# Patient Record
Sex: MALE | Race: WHITE | NOT HISPANIC OR LATINO | Employment: OTHER | URBAN - METROPOLITAN AREA
[De-identification: names, ages, dates, MRNs, and addresses within clinical notes are randomized per-mention and may not be internally consistent; named-entity substitution may affect disease eponyms.]

---

## 2019-07-20 ENCOUNTER — HOSPITAL ENCOUNTER (EMERGENCY)
Facility: HOSPITAL | Age: 59
Discharge: HOME | End: 2019-07-21
Attending: EMERGENCY MEDICINE | Admitting: EMERGENCY MEDICINE
Payer: COMMERCIAL

## 2019-07-20 VITALS
TEMPERATURE: 98.6 F | BODY MASS INDEX: 27.71 KG/M2 | OXYGEN SATURATION: 96 % | SYSTOLIC BLOOD PRESSURE: 173 MMHG | RESPIRATION RATE: 16 BRPM | HEIGHT: 70 IN | HEART RATE: 77 BPM | DIASTOLIC BLOOD PRESSURE: 84 MMHG | WEIGHT: 193.56 LBS
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
## 2019-07-20 PROCEDURE — 99281 EMR DPT VST MAYX REQ PHY/QHP: CPT

## 2019-07-20 RX ORDER — DIPHENHYDRAMINE HCL 25 MG
25 TABLET ORAL ONCE
Status: COMPLETED | OUTPATIENT
Start: 2019-07-21 | End: 2019-07-20

## 2019-07-20 RX ORDER — CEPHALEXIN 500 MG/1
500 CAPSULE ORAL EVERY 12 HOURS SCHEDULED
Qty: 14 CAPSULE | Refills: 0 | Status: SHIPPED | OUTPATIENT
Start: 2019-07-20 | End: 2019-07-27

## 2019-07-20 RX ORDER — CEPHALEXIN 250 MG/1
500 CAPSULE ORAL ONCE
Status: COMPLETED | OUTPATIENT
Start: 2019-07-20 | End: 2019-07-20

## 2019-07-20 RX ADMIN — CEPHALEXIN 500 MG: 250 CAPSULE ORAL at 23:50

## 2019-07-20 RX ADMIN — DIPHENHYDRAMINE HCL 25 MG: 25 TABLET ORAL at 23:50

## 2019-07-21 PROCEDURE — 99283 EMERGENCY DEPT VISIT LOW MDM: CPT | Performed by: PHYSICIAN ASSISTANT

## 2019-07-25 NOTE — ED PROVIDER NOTES
Pt Name: Lolly [place]  MRN: [id number]  YOB: 1960  Age/Sex: 63 y o  male  Date of evaluation: 7/20/2019  PCP: Malcolm London MD    [address] [place] [address]    Chief Complaint   Patient presents with    Insect Bite     Pt presents to ED from home w/ two bug bites noticed yesterday  Area itching, pt stated swelling, redness noted  Pt has hx of bug bites turning celluitis  Pt (+) hx stroke  HPI    Annamarie Bravo presents to the Emergency Department complaining of Insect bite, Rash  Lolly [place] is a 63 y o  male who presents due to Insect bite  Pt presents to ED from home w/ two bug bites noticed yesterday, sts that he was mowing the law, doing work outside and suspects it is from this time period  Pt notes area on L calf especially with itching, swelling, redness, warmth noted, notes he has hx bug bites turning celluitis and presents to ED for abx  Denies f/c/s, lightheadedness, dizziness, n/v, and no other complaints at this time  History provided by:  Patient   used: No    Insect Bite   Contact animal:  Insect  Location:  Leg  Leg injury location:  L lower leg and R lower leg  Pain details:     Quality:  Itching and localized    Severity:  Mild    Timing:  Constant    Progression:  Worsening  Incident location:  Outside and home  Tetanus status:  Up to date  Relieved by:  None tried  Worsened by:  Nothing  Ineffective treatments:  None tried  Associated symptoms: rash and swelling    Associated symptoms: no fever and no numbness          Past Medical and Surgical History    Past Medical History:   Diagnosis Date    Stroke [facility])        Past Surgical History:   Procedure Laterality Date    HERNIA REPAIR         History reviewed  No pertinent family history  Social History     Tobacco Use    Smoking status: Current Every Day Smoker     Packs/day: 1 00     Types: Cigarettes    Smokeless tobacco: Never Used   Substance Use Topics    Alcohol use:  Yes     Alcohol/week: 14 0 standard drinks     Types: 14 Glasses of wine per week    Drug use: Yes     Frequency: 1 0 times per week     Types: Marijuana           Allergies    Allergies   Allergen Reactions    Penicillins Rash       Home Medications:    Prior to Admission medications    Medication Sig Start Date End Date Taking? Authorizing Provider   cephalexin (KEFLEX) 500 mg capsule Take 1 capsule (500 mg total) by mouth every 12 (twelve) hours for 7 days 7/20/19 7/27/19  Sarah Wang PA-C           Review of Systems    Review of Systems   Constitutional: Negative for fever  Skin: Positive for rash  Neurological: Negative for numbness  All other systems reviewed and negative  Physical Exam      ED Triage Vitals [07/20/19 2255]   Temperature Pulse Respirations Blood Pressure SpO2   98 6 °F (37 °C) 77 16 (!) 173/84 96 %      Temp Source Heart Rate Source Patient Position - Orthostatic VS BP Location FiO2 (%)   Oral Monitor Sitting Left arm --      Pain Score       2               Physical Exam   Constitutional: He is oriented to person, place, and time  He appears well-developed and well-nourished  No distress  HENT:   Head: Normocephalic and atraumatic  Right Ear: External ear normal    Left Ear: External ear normal    Nose: Nose normal    Mouth/Throat: Oropharynx is clear and moist  No oropharyngeal exudate  Eyes: Pupils are equal, round, and reactive to light  Conjunctivae and EOM are normal    Neck: Normal range of motion  Neck supple  Cardiovascular: Normal rate, regular rhythm, normal heart sounds and intact distal pulses  Exam reveals no gallop and no friction rub  No murmur heard  Pulmonary/Chest: Effort normal and breath sounds normal  No respiratory distress  He has no wheezes  He has no rales  He exhibits no tenderness  Musculoskeletal: Normal range of motion  Neurological: He is alert and oriented to person, place, and time  Skin: Skin is warm   Capillary refill takes less than 2 seconds  Rash noted  He is not diaphoretic  There is erythema  No regional LAD           Diagnostic Results    ECG      Labs:    No results found for this or any previous visit  All labs reviewed and utilized in the medical decision making process    Radiology:    No orders to display       All radiology studies independently viewed by me and interpreted by the radiologist     Procedure    Procedures      Assessment and Plan    [facility]    Initial ED assessment:  Helio Villegas is a 63 y o  male with significant PMH for Cellulitis s/p insect bite who presents with Insect bite, Rash  Vitals signs reviewed   Physical examination remarkable for approximately 3 5 cm area of erythema, warmth, swelling on left medial calf with warmth, skin is blanchable, no noted drainage, US performed bedside with no visible collection  Initial Ddx  includes but is not limited to:   insect bite, spider bite, cellulitis, folliculitis, allergic reaction; doubt necrotizing fasciitis or anaphylaxis or abscess  Initial ED plan:   Plan will be to treat symptomatically with abx, cold pack  Final ED summary/disposition: Circled area of erythema with skin pen, first dose Abx given in ED  Home care recommendations given with discharge paperwork  Return to ED instructions given if new/worsening sxs  Coding    ED Course of Care and Re-Assessments                            Medications   cephalexin (KEFLEX) capsule 500 mg (500 mg Oral Given 7/20/19 2350)   diphenhydrAMINE (BENADRYL) tablet 25 mg (25 mg Oral Given 7/20/19 2350)         FINAL IMPRESSION    Final diagnoses:   Insect bite - bilateral legs without evidence of cellulitis         DISPOSITION/PLAN  Time reflects when diagnosis was documented in both MDM as applicable and the Disposition within this note     Time User Action Codes Description Comment    7/20/2019 11:40 PM Phil Bumpers  XXXA] Insect bite     7/20/2019 11:40 PM Liliana Grade Modify [K54  Lo Maldonado Insect bite bilateral legs without evidence of cellulitis      ED Disposition     ED Disposition Condition Date/Time Comment    Discharge Stable Sat Jul 20, 2019 11:40 PM Esther Sunshine discharge to home/self care  Follow-up Information     Follow up With Specialties Details Why Contact Info Additional [address] Emergency Department Emergency Medicine Go to  For follow up [address] [facility]  AN ED,  [address], [place], [place], [facility] Call  For follow up [address] [address]  [address] [facility] Internal Medicine [place], [address] [place] [address], [id number]              PATIENT REFERRED TO:    Dipesh Tolliver Emergency Department  [address] [address] [phone]  Go to   For follow up    [facility] [place]  [address] [id number] [phone]  Call   For follow up      DISCHARGE MEDICATIONS:    Discharge Medication List as of 7/20/2019 11:42 PM      START taking these medications    Details   cephalexin (KEFLEX) 500 mg capsule Take 1 capsule (500 mg total) by mouth every 12 (twelve) hours for 7 days, Starting Sat 7/20/2019, Until Sat 7/27/2019, Print             No discharge procedures on file           LYNSEY Feldman PA-C  07/25/19 9620

## 2019-09-13 ENCOUNTER — TELEPHONE (OUTPATIENT)
Dept: FAMILY MEDICINE CLINIC | Facility: CLINIC | Age: 59
End: 2019-09-13

## 2020-04-10 LAB — HBA1C MFR BLD HPLC: 5.7 %

## 2020-10-29 ENCOUNTER — TELEPHONE (OUTPATIENT)
Dept: FAMILY MEDICINE CLINIC | Facility: CLINIC | Age: 60
End: 2020-10-29

## 2020-10-29 ENCOUNTER — OFFICE VISIT (OUTPATIENT)
Dept: FAMILY MEDICINE CLINIC | Facility: CLINIC | Age: 60
End: 2020-10-29
Payer: COMMERCIAL

## 2020-10-29 VITALS
HEART RATE: 88 BPM | TEMPERATURE: 98 F | WEIGHT: 189.6 LBS | BODY MASS INDEX: 28.08 KG/M2 | OXYGEN SATURATION: 97 % | HEIGHT: 69 IN | RESPIRATION RATE: 20 BRPM | DIASTOLIC BLOOD PRESSURE: 100 MMHG | SYSTOLIC BLOOD PRESSURE: 144 MMHG

## 2020-10-29 DIAGNOSIS — Z86.73 HISTORY OF CVA (CEREBROVASCULAR ACCIDENT): ICD-10-CM

## 2020-10-29 DIAGNOSIS — E78.2 MIXED HYPERLIPIDEMIA: ICD-10-CM

## 2020-10-29 DIAGNOSIS — I10 ELEVATED SYSTOLIC BLOOD PRESSURE READING WITH DIAGNOSIS OF HYPERTENSION: ICD-10-CM

## 2020-10-29 DIAGNOSIS — Z12.5 ENCOUNTER FOR PROSTATE CANCER SCREENING: ICD-10-CM

## 2020-10-29 DIAGNOSIS — F32.1 CURRENT MODERATE EPISODE OF MAJOR DEPRESSIVE DISORDER WITHOUT PRIOR EPISODE (HCC): ICD-10-CM

## 2020-10-29 DIAGNOSIS — M79.672 LEFT FOOT PAIN: Primary | ICD-10-CM

## 2020-10-29 PROBLEM — F32.9 CURRENT EPISODE OF MAJOR DEPRESSIVE DISORDER WITHOUT PRIOR EPISODE: Status: ACTIVE | Noted: 2020-10-29

## 2020-10-29 PROCEDURE — 3077F SYST BP >= 140 MM HG: CPT | Performed by: FAMILY MEDICINE

## 2020-10-29 PROCEDURE — 3080F DIAST BP >= 90 MM HG: CPT | Performed by: FAMILY MEDICINE

## 2020-10-29 PROCEDURE — 99214 OFFICE O/P EST MOD 30 MIN: CPT | Performed by: FAMILY MEDICINE

## 2020-10-29 PROCEDURE — 3725F SCREEN DEPRESSION PERFORMED: CPT | Performed by: FAMILY MEDICINE

## 2020-10-29 RX ORDER — ARMODAFINIL 250 MG/1
250 TABLET ORAL AS NEEDED
COMMUNITY

## 2020-10-29 RX ORDER — ATORVASTATIN CALCIUM 20 MG/1
20 TABLET, FILM COATED ORAL DAILY
COMMUNITY
Start: 2020-10-03

## 2020-11-03 ENCOUNTER — HOSPITAL ENCOUNTER (OUTPATIENT)
Dept: RADIOLOGY | Facility: HOSPITAL | Age: 60
Discharge: HOME | End: 2020-11-03
Payer: COMMERCIAL

## 2020-11-03 DIAGNOSIS — M79.672 LEFT FOOT PAIN: ICD-10-CM

## 2020-11-03 PROCEDURE — 73630 X-RAY EXAM OF FOOT: CPT

## 2020-11-05 ENCOUNTER — TELEMEDICINE (OUTPATIENT)
Dept: GASTROENTEROLOGY | Facility: CLINIC | Age: 60
End: 2020-11-05

## 2020-11-05 VITALS — HEIGHT: 70 IN | BODY MASS INDEX: 27.2 KG/M2 | WEIGHT: 190 LBS

## 2020-11-05 DIAGNOSIS — Z86.010 HISTORY OF COLON POLYPS: ICD-10-CM

## 2020-11-05 DIAGNOSIS — Z86.010 HISTORY OF COLON POLYPS: Primary | ICD-10-CM

## 2020-11-05 PROCEDURE — 3008F BODY MASS INDEX DOCD: CPT | Performed by: FAMILY MEDICINE

## 2020-11-05 RX ORDER — SODIUM PICOSULFATE, MAGNESIUM OXIDE, AND ANHYDROUS CITRIC ACID 10; 3.5; 12 MG/160ML; G/160ML; G/160ML
LIQUID ORAL
Qty: 1 BOTTLE | Refills: 0 | Status: SHIPPED | OUTPATIENT
Start: 2020-11-05 | End: 2020-11-06

## 2020-11-06 RX ORDER — SODIUM PICOSULFATE, MAGNESIUM OXIDE, AND ANHYDROUS CITRIC ACID 10; 3.5; 12 MG/160ML; G/160ML; G/160ML
LIQUID ORAL
Qty: 320 ML | Refills: 0 | Status: SHIPPED | OUTPATIENT
Start: 2020-11-06

## 2020-11-09 ENCOUNTER — TELEPHONE (OUTPATIENT)
Dept: FAMILY MEDICINE CLINIC | Facility: CLINIC | Age: 60
End: 2020-11-09

## 2020-11-09 DIAGNOSIS — M79.672 LEFT FOOT PAIN: Primary | ICD-10-CM

## 2020-11-12 ENCOUNTER — HOSPITAL ENCOUNTER (OUTPATIENT)
Dept: GASTROENTEROLOGY | Facility: AMBULATORY SURGERY CENTER | Age: 60
Discharge: HOME | End: 2020-11-12
Payer: COMMERCIAL

## 2020-11-12 ENCOUNTER — ANESTHESIA (OUTPATIENT)
Dept: GASTROENTEROLOGY | Facility: AMBULATORY SURGERY CENTER | Age: 60
End: 2020-11-12

## 2020-11-12 ENCOUNTER — ANESTHESIA EVENT (OUTPATIENT)
Dept: GASTROENTEROLOGY | Facility: AMBULATORY SURGERY CENTER | Age: 60
End: 2020-11-12

## 2020-11-12 VITALS — HEART RATE: 72 BPM

## 2020-11-12 VITALS
HEART RATE: 72 BPM | SYSTOLIC BLOOD PRESSURE: 151 MMHG | TEMPERATURE: 97.8 F | RESPIRATION RATE: 16 BRPM | OXYGEN SATURATION: 96 % | DIASTOLIC BLOOD PRESSURE: 84 MMHG

## 2020-11-12 DIAGNOSIS — Z86.010 HISTORY OF COLON POLYPS: ICD-10-CM

## 2020-11-12 PROCEDURE — 88305 TISSUE EXAM BY PATHOLOGIST: CPT | Performed by: PATHOLOGY

## 2020-11-12 PROCEDURE — 45385 COLONOSCOPY W/LESION REMOVAL: CPT | Performed by: INTERNAL MEDICINE

## 2020-11-12 RX ORDER — SODIUM CHLORIDE 9 MG/ML
50 INJECTION, SOLUTION INTRAVENOUS CONTINUOUS
Status: DISCONTINUED | OUTPATIENT
Start: 2020-11-12 | End: 2020-11-16 | Stop reason: HOSPADM

## 2020-11-12 RX ORDER — PROPOFOL 10 MG/ML
INJECTION, EMULSION INTRAVENOUS AS NEEDED
Status: DISCONTINUED | OUTPATIENT
Start: 2020-11-12 | End: 2020-11-12

## 2020-11-12 RX ADMIN — SODIUM CHLORIDE 50 ML/HR: 9 INJECTION, SOLUTION INTRAVENOUS at 13:31

## 2020-11-12 RX ADMIN — PROPOFOL 100 MG: 10 INJECTION, EMULSION INTRAVENOUS at 14:17

## 2020-11-12 RX ADMIN — PROPOFOL 100 MG: 10 INJECTION, EMULSION INTRAVENOUS at 14:06

## 2020-11-12 RX ADMIN — SODIUM CHLORIDE: 9 INJECTION, SOLUTION INTRAVENOUS at 13:43

## 2020-11-12 RX ADMIN — PROPOFOL 200 MG: 10 INJECTION, EMULSION INTRAVENOUS at 14:02

## 2021-07-13 ENCOUNTER — OFFICE VISIT (OUTPATIENT)
Dept: URGENT CARE | Facility: CLINIC | Age: 61
End: 2021-07-13
Payer: COMMERCIAL

## 2021-07-13 VITALS
BODY MASS INDEX: 27.77 KG/M2 | DIASTOLIC BLOOD PRESSURE: 90 MMHG | RESPIRATION RATE: 20 BRPM | TEMPERATURE: 97.7 F | WEIGHT: 194 LBS | OXYGEN SATURATION: 98 % | SYSTOLIC BLOOD PRESSURE: 168 MMHG | HEIGHT: 70 IN | HEART RATE: 80 BPM

## 2021-07-13 DIAGNOSIS — M79.89 ARM SWELLING: Primary | ICD-10-CM

## 2021-07-13 PROCEDURE — 99203 OFFICE O/P NEW LOW 30 MIN: CPT | Performed by: PHYSICIAN ASSISTANT

## 2021-07-13 RX ORDER — PREDNISONE 10 MG/1
TABLET ORAL
Qty: 21 TABLET | Refills: 0 | Status: SHIPPED | OUTPATIENT
Start: 2021-07-13

## 2021-07-13 NOTE — PROGRESS NOTES
[facility]        NAME: Patsy Canada is a 62 y o  male  : 1960    MRN: [id number]  DATE: 2021  TIME: 9:44 AM    Assessment and Plan   Arm swelling [M79 89]  1  Arm swelling  predniSONE 10 mg tablet         Patient Instructions     Take prednisone as directed   Recommend ice  Monitor for worsening symptoms such as fever, increasing redness, etc   Follow up with PCP in 3-5 days  Proceed to  ER if symptoms worsen  Chief Complaint     Chief Complaint   Patient presents with    Cellulitis     insect sting 2 days ago to right inner forearm with increasing area of pale erythema and swelling  History of Present Illness       Patient presents with complaint of right arm swelling for the past 2 days  He reports associated mild redness  He denies fever, chills, sweats, itchiness, pain, and paresthesias  Patient states that an insect stung him 2 days ago and he has been taking left over Keflex from a prior cellulitis  Patient states that he has taken 6 tablets of 500 mg cephalexin  Pt reports that he has had similar symptoms which eventually turned his skin black and required an antibiotic  Review of Systems   Review of Systems   Constitutional: Negative for chills and fever  HENT: Negative for ear pain and sore throat  Eyes: Negative for pain and visual disturbance  Respiratory: Negative for cough and shortness of breath  Cardiovascular: Negative for chest pain and palpitations  Gastrointestinal: Negative for abdominal pain and vomiting  Genitourinary: Negative for dysuria and hematuria  Musculoskeletal: Positive for joint swelling  Negative for arthralgias and back pain  Skin: Negative for color change and rash  Neurological: Negative for seizures and syncope  All other systems reviewed and are negative          Current Medications       Current Outpatient Medications:     Armodafinil 250 MG tablet, Take 250 mg by mouth as needed , Disp: , Rfl:    atorvastatin (LIPITOR) 20 mg tablet, Take 20 mg by mouth daily, Disp: , Rfl:     Clenpiq 10-3 5-12 MG-GM -GM/160ML SOLN, AS DIRECTED (1 KIT), Disp: 320 mL, Rfl: 0    predniSONE 10 mg tablet, Take 6 tabs on day 1, 5 tabs on day 2, 4 tabs on day 3, 3 tabs on day 4, 2 tabs on day 5, and 1 tab on day 6, Disp: 21 tablet, Rfl: 0    Current Allergies     Allergies as of 07/13/2021 - Reviewed 07/13/2021   Allergen Reaction Noted    Penicillins Hives 07/20/2019            The following portions of the patient's history were reviewed and updated as appropriate: allergies, current medications, past family history, past medical history, past social history, past surgical history and problem list      Past Medical History:   Diagnosis Date    Colon polyp     Hyperlipidemia     Stroke ([facility] [address] )     left sided numbness residual       Past Surgical History:   Procedure Laterality Date    COLONOSCOPY      HERNIA REPAIR Left     inguinal       Family History   Problem Relation Age of Onset    Arthritis Mother     Alcohol abuse Father     Mental illness Father     Alcohol abuse Sister     Alcohol abuse Brother          Medications have been verified  Objective   /90   Pulse 80   Temp 97 7 °F (36 5 °C)   Resp 20   Ht 5' 9 5" (1 765 m)   Wt 88 kg (194 lb)   SpO2 98%   BMI 28 24 kg/m²   No LMP for male patient  Physical Exam     Physical Exam  Vitals and nursing note reviewed  Constitutional:       General: He is not in acute distress  Appearance: Normal appearance  He is well-developed  He is not ill-appearing or diaphoretic  HENT:      Head: Normocephalic and atraumatic  Eyes:      Conjunctiva/sclera: Conjunctivae normal       Pupils: Pupils are equal, round, and reactive to light  Cardiovascular:      Rate and Rhythm: Normal rate and regular rhythm  Heart sounds: Normal heart sounds  Pulmonary:      Effort: Pulmonary effort is normal  No respiratory distress        Breath sounds: Normal breath sounds  No stridor  No wheezing, rhonchi or rales  Musculoskeletal:         General: Swelling present  No tenderness  Normal range of motion  Cervical back: Normal range of motion and neck supple  Comments: Right forearm w/ mild swelling and faint erythema of anterior aspect approx 12 cm in length; blanchable; NTTP; no wounds, no red streaking   Lymphadenopathy:      Cervical: No cervical adenopathy  Skin:     General: Skin is warm and dry  Capillary Refill: Capillary refill takes less than 2 seconds  Findings: No rash  Neurological:      Mental Status: He is alert and oriented to person, place, and time  Cranial Nerves: No cranial nerve deficit  Sensory: No sensory deficit  Psychiatric:         Behavior: Behavior normal          Thought Content:  Thought content normal

## 2022-01-07 ENCOUNTER — OFFICE VISIT (OUTPATIENT)
Dept: PODIATRY | Facility: CLINIC | Age: 62
End: 2022-01-07
Payer: COMMERCIAL

## 2022-01-07 VITALS
WEIGHT: 209 LBS | SYSTOLIC BLOOD PRESSURE: 206 MMHG | HEART RATE: 84 BPM | BODY MASS INDEX: 30.96 KG/M2 | HEIGHT: 69 IN | DIASTOLIC BLOOD PRESSURE: 160 MMHG

## 2022-01-07 DIAGNOSIS — M79.5 FOREIGN BODY (FB) IN SOFT TISSUE: Primary | ICD-10-CM

## 2022-01-07 PROCEDURE — 99203 OFFICE O/P NEW LOW 30 MIN: CPT | Performed by: PODIATRIST

## 2022-01-07 NOTE — PROGRESS NOTES
Assessment/Plan:         Diagnoses and all orders for this visit:    Foreign body (FB) in soft tissue  -     US extremity soft tissue; Future       Diagnosis and options discussed with patient  Patient agreeable to the plan as stated below    Patient has chronic pain in his left foot stemming from old puncture wound  Per his history there is a possibility of retained foreign body or glass  It is never showed up on x-rays  I recommended possible ultrasound as this would likely be our best chance at seeing a hypoechoic signal   MRI scan often miss small foreign bodies in the forefoot  If the ultrasound is negative we could still do an MRI but I think an ultrasound will be all that we need  If there is retained material in there it appears to be symptomatic in which case we could remove it  I will see the patient after the ultrasound    Subjective:      Patient ID: Yvon Shabazz is a 62 y o  male  Patient presents with chronic foot pain  About 5 years ago he got a small piece of glass in his left foot which got infected  His PCP lanced it and drained an abscess but was unable to find any foreign body  Ever since then the foot has felt swollen, like something deep trying to get out  The central metatarsal area just feels swollen and painful both plantar and dorsal  He has had an XR which was normal  He was supposed to get an MRI but insurance wouldn't approve it so he stopped trying  He does report some left sided numbness ever since a stroke years ago  The following portions of the patient's history were reviewed and updated as appropriate:   He  has a past medical history of Colon polyp, Hyperlipidemia, and Stroke ([facility] [address] )    He   Patient Active Problem List    Diagnosis Date Noted    Mixed hyperlipidemia 10/29/2020    History of CVA (cerebrovascular accident) 10/29/2020    Current episode of major depressive disorder without prior episode 10/29/2020     He  has a past surgical history that includes Colonoscopy and Hernia repair (Left)  His family history includes Alcohol abuse in his brother, father, and sister; Arthritis in his mother; Mental illness in his father  He  reports that he has been smoking pipe  He has been smoking about 1 00 pack per day  He has never used smokeless tobacco  He reports current alcohol use of about 14 0 standard drinks of alcohol per week  He reports current drug use  Frequency: 1 00 time per week  Drug: Marijuana  Current Outpatient Medications   Medication Sig Dispense Refill    Armodafinil 250 MG tablet Take 250 mg by mouth as needed  (Patient not taking: Reported on 1/7/2022 )      atorvastatin (LIPITOR) 20 mg tablet Take 20 mg by mouth daily (Patient not taking: Reported on 1/7/2022 )      Clenpiq 10-3 5-12 MG-GM -GM/160ML SOLN AS DIRECTED (1 KIT) (Patient not taking: Reported on 1/7/2022) 320 mL 0    predniSONE 10 mg tablet Take 6 tabs on day 1, 5 tabs on day 2, 4 tabs on day 3, 3 tabs on day 4, 2 tabs on day 5, and 1 tab on day 6 (Patient not taking: Reported on 1/7/2022 ) 21 tablet 0     No current facility-administered medications for this visit  Current Outpatient Medications on File Prior to Visit   Medication Sig    Armodafinil 250 MG tablet Take 250 mg by mouth as needed  (Patient not taking: Reported on 1/7/2022 )    atorvastatin (LIPITOR) 20 mg tablet Take 20 mg by mouth daily (Patient not taking: Reported on 1/7/2022 )    Clenpiq 10-3 5-12 MG-GM -GM/160ML SOLN AS DIRECTED (1 KIT) (Patient not taking: Reported on 1/7/2022)    predniSONE 10 mg tablet Take 6 tabs on day 1, 5 tabs on day 2, 4 tabs on day 3, 3 tabs on day 4, 2 tabs on day 5, and 1 tab on day 6 (Patient not taking: Reported on 1/7/2022 )     No current facility-administered medications on file prior to visit  He is allergic to penicillins       Review of Systems   Constitutional: Negative  HENT: Negative  Respiratory: Negative for cough and shortness of breath      Cardiovascular: Negative for leg swelling  Gastrointestinal: Negative for diarrhea, nausea and vomiting  Musculoskeletal: Positive for arthralgias and joint swelling  Skin: Negative for color change, rash and wound  Neurological: Negative for weakness and numbness  Psychiatric/Behavioral: The patient is not nervous/anxious  Objective:      BP (!) 206/160   Pulse 84   Ht 5' 9" (1 753 m) Comment: verbal  Wt 94 8 kg (209 lb)   BMI 30 86 kg/m²          Physical Exam      Vitals reviewed    Constitutional: Patient is not distressed  Patient is well developed  Patient is obese  Vascular: Dorsalis pedis and posterior tibial pulses palpable  Capillary refill time within normal limits to all digits  No erythema  No edema  No significant varicosities  Dermatology: No rash  No open lesions  Present pedal hair  Skin has healthy turgor  Musculoskeletal: Normal range of motion to ankle, subtalar joint, and midtarsal joint  Normal range of motion first MTPJ  Manual muscle testing 5 out of 5 for inversion/eversion/dorsiflexion/plantarflexion  On stance patients feet are generally rectus  No direct area of pain, negative mortons sign, no MTPJ instability  Vague feeling of pressure in the central metatarsal areas left foot     Neurological: Monofilament sensation is intact  Vibratory sensation is intact  Achilles reflex is normal    Proprioception is normal    Respiratory: Normal respiratory effort, no distress    Psych: Patient is AAOx3  Normal mood       Lymphatic: nonpalpable popliteal lymph nodes  Nonpalpable groin lymph nodes

## 2022-01-10 ENCOUNTER — HOSPITAL ENCOUNTER (OUTPATIENT)
Dept: ULTRASOUND IMAGING | Facility: HOSPITAL | Age: 62
Discharge: HOME | End: 2022-01-10
Attending: PODIATRIST
Payer: COMMERCIAL

## 2022-01-10 DIAGNOSIS — M79.5 FOREIGN BODY (FB) IN SOFT TISSUE: ICD-10-CM

## 2022-01-10 PROCEDURE — 76882 US LMTD JT/FCL EVL NVASC XTR: CPT

## 2022-01-25 ENCOUNTER — OFFICE VISIT (OUTPATIENT)
Dept: PODIATRY | Facility: CLINIC | Age: 62
End: 2022-01-25
Payer: COMMERCIAL

## 2022-01-25 VITALS
BODY MASS INDEX: 30.51 KG/M2 | DIASTOLIC BLOOD PRESSURE: 90 MMHG | SYSTOLIC BLOOD PRESSURE: 150 MMHG | HEIGHT: 69 IN | WEIGHT: 206 LBS

## 2022-01-25 DIAGNOSIS — M79.672 CHRONIC FOOT PAIN, LEFT: ICD-10-CM

## 2022-01-25 DIAGNOSIS — M79.5 FOREIGN BODY (FB) IN SOFT TISSUE: Primary | ICD-10-CM

## 2022-01-25 DIAGNOSIS — G89.29 CHRONIC FOOT PAIN, LEFT: ICD-10-CM

## 2022-01-25 PROCEDURE — 99213 OFFICE O/P EST LOW 20 MIN: CPT | Performed by: PODIATRIST

## 2022-01-25 NOTE — PROGRESS NOTES
Assessment/Plan:      Diagnoses and all orders for this visit:    Foreign body (FB) in soft tissue  -     MRI foot/forefoot toes left w wo contrast; Future  -     Renal function panel; Future    Chronic foot pain, left  -     MRI foot/forefoot toes left w wo contrast; Future  -     Renal function panel; Future      Us reviewed with patient  NO evidence of fluid or foreign body  He has very nonspecific metatarsal shaft pain in the midfoot but no specific area of pain  He is requesting an MRI, I would suggest contrast  I will call with results  Subjective:     Patient ID: Megha Sanchez is a 62 y o  male  1/7/22: Patient presents with chronic foot pain  About 5 years ago he got a small piece of glass in his left foot which got infected  His PCP lanced it and drained an abscess but was unable to find any foreign body  Ever since then the foot has felt swollen, like something deep trying to get out  The central metatarsal area just feels swollen and painful both plantar and dorsal  He has had an XR which was normal  He was supposed to get an MRI but insurance wouldn't approve it so he stopped trying  He does report some left sided numbness ever since a stroke years ago  1/25/22: Patient has ultrasound of foot which was normal  He has no change in symptoms  HE swears there is retained glass and infection in his foot " He has a sensation of pain and swelling dorsally and plantarly  Review of Systems   Constitutional: Negative  Respiratory: Negative for shortness of breath  Gastrointestinal: Negative for diarrhea, nausea and vomiting  Musculoskeletal: Positive for arthralgias, gait problem and joint swelling  Skin: Negative for color change and wound  Neurological: Negative for weakness and numbness  Psychiatric/Behavioral: The patient is not nervous/anxious  Objective:     Physical Exam  Vitals reviewed  Cardiovascular:      Rate and Rhythm: Normal rate        Pulses: Normal pulses  Dorsalis pedis pulses are 2+ on the left side  Posterior tibial pulses are 2+ on the left side  Pulmonary:      Effort: Pulmonary effort is normal  No respiratory distress  Musculoskeletal:         General: Tenderness (nonspecific left foot tenderness doirsal and plantar central metatarsals  left foot) present  No swelling  Left foot: Normal range of motion  No deformity, bunion, foot drop or prominent metatarsal heads  Feet:      Left foot:      Protective Sensation: 10 sites tested  10 sites sensed  Skin integrity: Skin integrity normal       Toenail Condition: Left toenails are normal    Skin:     Findings: No bruising, erythema, lesion or rash  Neurological:      Mental Status: He is alert and oriented to person, place, and time  Sensory: No sensory deficit

## 2022-02-21 ENCOUNTER — HOSPITAL ENCOUNTER (OUTPATIENT)
Dept: MRI IMAGING | Facility: HOSPITAL | Age: 62
Discharge: HOME | End: 2022-02-21
Attending: PODIATRIST
Payer: COMMERCIAL

## 2022-02-21 DIAGNOSIS — M79.5 FOREIGN BODY (FB) IN SOFT TISSUE: ICD-10-CM

## 2022-02-21 DIAGNOSIS — G89.29 CHRONIC FOOT PAIN, LEFT: ICD-10-CM

## 2022-02-21 DIAGNOSIS — M79.672 CHRONIC FOOT PAIN, LEFT: ICD-10-CM

## 2022-02-21 PROCEDURE — A9585 GADOBUTROL INJECTION: HCPCS | Performed by: PODIATRIST

## 2022-02-21 PROCEDURE — 73720 MRI LWR EXTREMITY W/O&W/DYE: CPT

## 2022-02-21 PROCEDURE — G1004 CDSM NDSC: HCPCS

## 2022-02-21 RX ADMIN — GADOBUTROL 7 ML: 604.72 INJECTION INTRAVENOUS at 12:30

## 2022-03-04 ENCOUNTER — TELEPHONE (OUTPATIENT)
Dept: PAIN MEDICINE | Facility: MEDICAL CENTER | Age: 62
End: 2022-03-04

## 2022-03-04 NOTE — TELEPHONE ENCOUNTER
Pt called that he completed his MRI and blood panel and would like to know what his next steps are       Pt can be reached at [phone]

## 2022-03-07 ENCOUNTER — TELEPHONE (OUTPATIENT)
Dept: PODIATRY | Facility: CLINIC | Age: 62
End: 2022-03-07

## 2022-03-07 NOTE — TELEPHONE ENCOUNTER
Alessandro Gonzalez DPM  You 2 hours ago (8:51 AM)         His MRI is normal  He originally wanted me to "cut out" foreign body from his foot but both ultrasound and MRI are negative, completely normal  I think he just has scar tissue and may need to treat this conservatively  Message text      Informed patient of this information and he verbalized understanding to me

## 2024-10-11 ENCOUNTER — HOSPITAL ENCOUNTER (INPATIENT)
Facility: HOSPITAL | Age: 64
LOS: 4 days | Discharge: HOME | DRG: 194 | End: 2024-10-15
Attending: EMERGENCY MEDICINE | Admitting: FAMILY MEDICINE
Payer: COMMERCIAL

## 2024-10-11 ENCOUNTER — OFFICE VISIT (OUTPATIENT)
Dept: URGENT CARE | Facility: CLINIC | Age: 64
End: 2024-10-11
Payer: COMMERCIAL

## 2024-10-11 ENCOUNTER — APPOINTMENT (EMERGENCY)
Dept: RADIOLOGY | Facility: HOSPITAL | Age: 64
DRG: 194 | End: 2024-10-11
Payer: COMMERCIAL

## 2024-10-11 ENCOUNTER — APPOINTMENT (EMERGENCY)
Dept: CT IMAGING | Facility: HOSPITAL | Age: 64
DRG: 194 | End: 2024-10-11
Payer: COMMERCIAL

## 2024-10-11 VITALS
SYSTOLIC BLOOD PRESSURE: 158 MMHG | RESPIRATION RATE: 22 BRPM | OXYGEN SATURATION: 94 % | DIASTOLIC BLOOD PRESSURE: 102 MMHG | TEMPERATURE: 97.5 F | HEART RATE: 97 BPM

## 2024-10-11 DIAGNOSIS — R06.02 SHORTNESS OF BREATH: Primary | ICD-10-CM

## 2024-10-11 DIAGNOSIS — F32.1 CURRENT MODERATE EPISODE OF MAJOR DEPRESSIVE DISORDER WITHOUT PRIOR EPISODE (HCC): ICD-10-CM

## 2024-10-11 DIAGNOSIS — I50.9 CONGESTIVE HEART FAILURE (CHF) (HCC): ICD-10-CM

## 2024-10-11 DIAGNOSIS — I50.9 CHF (CONGESTIVE HEART FAILURE) (HCC): Primary | ICD-10-CM

## 2024-10-11 PROBLEM — R03.0 ELEVATED BLOOD PRESSURE READING: Status: ACTIVE | Noted: 2024-10-11

## 2024-10-11 PROBLEM — R79.89 ELEVATED TROPONIN: Status: ACTIVE | Noted: 2024-10-11

## 2024-10-11 PROBLEM — E87.70 VOLUME OVERLOAD: Status: ACTIVE | Noted: 2024-10-11

## 2024-10-11 PROBLEM — Z72.0 TOBACCO ABUSE: Status: ACTIVE | Noted: 2024-10-11

## 2024-10-11 PROBLEM — R94.31 QT PROLONGATION: Status: ACTIVE | Noted: 2024-10-11

## 2024-10-11 LAB
2HR DELTA HS TROPONIN: -32 NG/L
4HR DELTA HS TROPONIN: -19 NG/L
ANION GAP SERPL CALCULATED.3IONS-SCNC: 9 MMOL/L (ref 4–13)
APTT PPP: 31 SECONDS (ref 23–34)
ATRIAL RATE: 84 BPM
BASOPHILS # BLD AUTO: 0.06 THOUSANDS/ΜL (ref 0–0.1)
BASOPHILS NFR BLD AUTO: 1 % (ref 0–1)
BNP SERPL-MCNC: 1782 PG/ML (ref 0–100)
BUN SERPL-MCNC: 20 MG/DL (ref 5–25)
CALCIUM SERPL-MCNC: 9.3 MG/DL (ref 8.4–10.2)
CARDIAC TROPONIN I PNL SERPL HS: 113 NG/L
CARDIAC TROPONIN I PNL SERPL HS: 126 NG/L
CARDIAC TROPONIN I PNL SERPL HS: 145 NG/L
CHLORIDE SERPL-SCNC: 105 MMOL/L (ref 96–108)
CHOLEST SERPL-MCNC: 144 MG/DL
CO2 SERPL-SCNC: 22 MMOL/L (ref 21–32)
CREAT SERPL-MCNC: 1.06 MG/DL (ref 0.6–1.3)
D DIMER PPP FEU-MCNC: 1.62 UG/ML FEU
EOSINOPHIL # BLD AUTO: 0.24 THOUSAND/ΜL (ref 0–0.61)
EOSINOPHIL NFR BLD AUTO: 2 % (ref 0–6)
ERYTHROCYTE [DISTWIDTH] IN BLOOD BY AUTOMATED COUNT: 13.6 % (ref 11.6–15.1)
EST. AVERAGE GLUCOSE BLD GHB EST-MCNC: 111 MG/DL
GFR SERPL CREATININE-BSD FRML MDRD: 74 ML/MIN/1.73SQ M
GLUCOSE SERPL-MCNC: 112 MG/DL (ref 65–140)
HBA1C MFR BLD: 5.5 %
HCT VFR BLD AUTO: 38.9 % (ref 36.5–49.3)
HDLC SERPL-MCNC: 34 MG/DL
HGB BLD-MCNC: 12.9 G/DL (ref 12–17)
IMM GRANULOCYTES # BLD AUTO: 0.05 THOUSAND/UL (ref 0–0.2)
IMM GRANULOCYTES NFR BLD AUTO: 1 % (ref 0–2)
INR PPP: 1.22 (ref 0.85–1.19)
LDLC SERPL CALC-MCNC: 89 MG/DL (ref 0–100)
LYMPHOCYTES # BLD AUTO: 1.51 THOUSANDS/ΜL (ref 0.6–4.47)
LYMPHOCYTES NFR BLD AUTO: 14 % (ref 14–44)
MAGNESIUM SERPL-MCNC: 1.9 MG/DL (ref 1.9–2.7)
MCH RBC QN AUTO: 29.9 PG (ref 26.8–34.3)
MCHC RBC AUTO-ENTMCNC: 33.2 G/DL (ref 31.4–37.4)
MCV RBC AUTO: 90 FL (ref 82–98)
MONOCYTES # BLD AUTO: 0.71 THOUSAND/ΜL (ref 0.17–1.22)
MONOCYTES NFR BLD AUTO: 6 % (ref 4–12)
NEUTROPHILS # BLD AUTO: 8.53 THOUSANDS/ΜL (ref 1.85–7.62)
NEUTS SEG NFR BLD AUTO: 76 % (ref 43–75)
NRBC BLD AUTO-RTO: 0 /100 WBCS
P AXIS: 59 DEGREES
PLATELET # BLD AUTO: 206 THOUSANDS/UL (ref 149–390)
PMV BLD AUTO: 10.6 FL (ref 8.9–12.7)
POTASSIUM SERPL-SCNC: 4.1 MMOL/L (ref 3.5–5.3)
PR INTERVAL: 168 MS
PROTHROMBIN TIME: 15.9 SECONDS (ref 12.3–15)
QRS AXIS: -19 DEGREES
QRSD INTERVAL: 98 MS
QT INTERVAL: 432 MS
QTC INTERVAL: 510 MS
RBC # BLD AUTO: 4.32 MILLION/UL (ref 3.88–5.62)
SODIUM SERPL-SCNC: 136 MMOL/L (ref 135–147)
T WAVE AXIS: 46 DEGREES
TRIGL SERPL-MCNC: 107 MG/DL
VENTRICULAR RATE: 84 BPM
WBC # BLD AUTO: 11.1 THOUSAND/UL (ref 4.31–10.16)

## 2024-10-11 PROCEDURE — 83880 ASSAY OF NATRIURETIC PEPTIDE: CPT | Performed by: EMERGENCY MEDICINE

## 2024-10-11 PROCEDURE — 99285 EMERGENCY DEPT VISIT HI MDM: CPT | Performed by: EMERGENCY MEDICINE

## 2024-10-11 PROCEDURE — 94664 DEMO&/EVAL PT USE INHALER: CPT

## 2024-10-11 PROCEDURE — 93005 ELECTROCARDIOGRAM TRACING: CPT

## 2024-10-11 PROCEDURE — 83036 HEMOGLOBIN GLYCOSYLATED A1C: CPT | Performed by: PHYSICIAN ASSISTANT

## 2024-10-11 PROCEDURE — 84484 ASSAY OF TROPONIN QUANT: CPT | Performed by: PHYSICIAN ASSISTANT

## 2024-10-11 PROCEDURE — 94760 N-INVAS EAR/PLS OXIMETRY 1: CPT

## 2024-10-11 PROCEDURE — 71046 X-RAY EXAM CHEST 2 VIEWS: CPT

## 2024-10-11 PROCEDURE — 85610 PROTHROMBIN TIME: CPT | Performed by: EMERGENCY MEDICINE

## 2024-10-11 PROCEDURE — 85379 FIBRIN DEGRADATION QUANT: CPT | Performed by: EMERGENCY MEDICINE

## 2024-10-11 PROCEDURE — 71275 CT ANGIOGRAPHY CHEST: CPT

## 2024-10-11 PROCEDURE — 80061 LIPID PANEL: CPT | Performed by: PHYSICIAN ASSISTANT

## 2024-10-11 PROCEDURE — 36415 COLL VENOUS BLD VENIPUNCTURE: CPT | Performed by: EMERGENCY MEDICINE

## 2024-10-11 PROCEDURE — 84484 ASSAY OF TROPONIN QUANT: CPT | Performed by: EMERGENCY MEDICINE

## 2024-10-11 PROCEDURE — 83735 ASSAY OF MAGNESIUM: CPT | Performed by: PHYSICIAN ASSISTANT

## 2024-10-11 PROCEDURE — 99213 OFFICE O/P EST LOW 20 MIN: CPT

## 2024-10-11 PROCEDURE — 99223 1ST HOSP IP/OBS HIGH 75: CPT | Performed by: PHYSICIAN ASSISTANT

## 2024-10-11 PROCEDURE — 85730 THROMBOPLASTIN TIME PARTIAL: CPT | Performed by: EMERGENCY MEDICINE

## 2024-10-11 PROCEDURE — 85025 COMPLETE CBC W/AUTO DIFF WBC: CPT | Performed by: EMERGENCY MEDICINE

## 2024-10-11 PROCEDURE — 80048 BASIC METABOLIC PNL TOTAL CA: CPT | Performed by: EMERGENCY MEDICINE

## 2024-10-11 PROCEDURE — 99285 EMERGENCY DEPT VISIT HI MDM: CPT

## 2024-10-11 RX ORDER — ASPIRIN 81 MG/1
324 TABLET, CHEWABLE ORAL ONCE
Status: COMPLETED | OUTPATIENT
Start: 2024-10-11 | End: 2024-10-11

## 2024-10-11 RX ORDER — FUROSEMIDE 10 MG/ML
40 INJECTION INTRAMUSCULAR; INTRAVENOUS
Status: DISCONTINUED | OUTPATIENT
Start: 2024-10-12 | End: 2024-10-12

## 2024-10-11 RX ORDER — ENOXAPARIN SODIUM 100 MG/ML
40 INJECTION SUBCUTANEOUS DAILY
Status: DISCONTINUED | OUTPATIENT
Start: 2024-10-12 | End: 2024-10-15 | Stop reason: HOSPADM

## 2024-10-11 RX ORDER — FUROSEMIDE 10 MG/ML
40 INJECTION INTRAMUSCULAR; INTRAVENOUS ONCE
Status: COMPLETED | OUTPATIENT
Start: 2024-10-11 | End: 2024-10-11

## 2024-10-11 RX ORDER — LEVALBUTEROL INHALATION SOLUTION 1.25 MG/3ML
1.25 SOLUTION RESPIRATORY (INHALATION) EVERY 8 HOURS PRN
Status: DISCONTINUED | OUTPATIENT
Start: 2024-10-11 | End: 2024-10-11

## 2024-10-11 RX ORDER — ACETAMINOPHEN 325 MG/1
650 TABLET ORAL EVERY 6 HOURS PRN
Status: DISCONTINUED | OUTPATIENT
Start: 2024-10-11 | End: 2024-10-15 | Stop reason: HOSPADM

## 2024-10-11 RX ORDER — NICOTINE 21 MG/24HR
1 PATCH, TRANSDERMAL 24 HOURS TRANSDERMAL DAILY
Status: DISCONTINUED | OUTPATIENT
Start: 2024-10-12 | End: 2024-10-15 | Stop reason: HOSPADM

## 2024-10-11 RX ADMIN — FUROSEMIDE 40 MG: 10 INJECTION, SOLUTION INTRAVENOUS at 15:46

## 2024-10-11 RX ADMIN — IOHEXOL 85 ML: 350 INJECTION, SOLUTION INTRAVENOUS at 14:41

## 2024-10-11 RX ADMIN — ASPIRIN 81 MG CHEWABLE TABLET 324 MG: 81 TABLET CHEWABLE at 15:05

## 2024-10-11 NOTE — ED PROVIDER NOTES
"Time reflects when diagnosis was documented in both MDM as applicable and the Disposition within this note       Time User Action Codes Description Comment    10/11/2024  3:24 PM Sean Arizmendi Add [I50.9] CHF (congestive heart failure) (HCC)           ED Disposition       ED Disposition   Admit    Condition   Stable    Date/Time   Fri Oct 11, 2024  3:24 PM    Comment   Case was discussed with IM and the patient's admission status was agreed to be Admission Status: inpatient status to the service of Dr. Alfaro .               Assessment & Plan       Medical Decision Making  64-year-old male with worsening shortness of breath.  Differential diagnosis includes but not limited to PE, CHF, ACS.  Obtain cardiopulmonary evaluation.    CT reveals pulmonary edema suspected new onset CHF.  Administer diuretics and admit to [facility].    Amount and/or Complexity of Data Reviewed  Labs: ordered.  Radiology: ordered.    Risk  OTC drugs.  Prescription drug management.  Decision regarding hospitalization.             Medications   furosemide (LASIX) injection 40 mg (has no administration in time range)   aspirin chewable tablet 324 mg (324 mg Oral Given 10/11/24 1505)   iohexol (OMNIPAQUE) 350 MG/ML injection (MULTI-DOSE) 100 mL (85 mL Intravenous Given 10/11/24 1441)   furosemide (LASIX) injection 40 mg (40 mg Intravenous Given 10/11/24 1546)       ED Risk Strat Scores                                               History of Present Illness       Chief Complaint   Patient presents with    Shortness of Breath     Pt states \"I smoke my whole life and it seems that I'm short of breath. Seemed that it started when I took steroids couple months ago. I feel like I'm getting sob way too quick. Even just walking from the parking lot to here.\" Denies cp.       Past Medical History:   Diagnosis Date    Colon polyp     Hyperlipidemia     Stroke (HCC)     left sided numbness residual      Past Surgical History:   Procedure Laterality Date    " COLONOSCOPY      HERNIA REPAIR Left     inguinal      Family History   Problem Relation Age of Onset    Arthritis Mother     Alcohol abuse Father     Mental illness Father     Alcohol abuse Sister     Alcohol abuse Brother       Social History     Tobacco Use    Smoking status: Every Day     Current packs/day: 0.00     Types: Pipe, Cigarettes     Last attempt to quit: 10/29/2005     Years since quittin.9    Smokeless tobacco: Never    Tobacco comments:     patient is currently using pipe   Vaping Use    Vaping status: Never Used   Substance Use Topics    Alcohol use: Yes     Alcohol/week: 14.0 standard drinks of alcohol     Types: 14 Glasses of wine per week    Drug use: Yes     Frequency: 1.0 times per week     Types: Marijuana      E-Cigarette/Vaping    E-Cigarette Use Never User       E-Cigarette/Vaping Substances    Nicotine No     THC No     CBD No     Flavoring No     Other No     Unknown No       I have reviewed and agree with the history as documented.     64-year-old male presenting with worsening shortness of breath that started approximately 2 months ago but has been progressively worse over the last few weeks.  Denies associated chest pain.  States he cannot walk up a full flight of stairs without becoming very short of breath.  Currently feels okay as long as he is not moving.        Review of Systems   Respiratory:  Positive for shortness of breath.            Objective       ED Triage Vitals   Temperature Pulse Blood Pressure Respirations SpO2 Patient Position - Orthostatic VS   10/11/24 1251 10/11/24 1250 10/11/24 1250 10/11/24 1248 10/11/24 1250 10/11/24 1250   97.5 °F (36.4 °C) 87 (!) 176/110 20 99 % Sitting      Temp Source Heart Rate Source BP Location FiO2 (%) Pain Score    10/11/24 1251 10/11/24 1248 10/11/24 1250 -- 10/11/24 1248    Temporal Monitor Right arm  No Pain      Vitals      Date and Time Temp Pulse SpO2 Resp BP Pain Score FACES Pain Rating User   10/11/24 1725 -- 80 93 % 20 --  -- -- DH   10/11/24 1637 97.2 °F (36.2 °C) 83 96 % -- 165/102 -- -- DII   10/11/24 1634 -- -- -- -- -- No Pain -- KR   10/11/24 1508 -- 82 96 % 22 164/111 No Pain -- SD   10/11/24 1251 97.5 °F (36.4 °C) -- -- -- -- -- -- MC   10/11/24 1250 -- 87 99 % 20 176/110 No Pain -- MC   10/11/24 1248 -- -- -- 20 -- No Pain --             Physical Exam  Vitals and nursing note reviewed.   Constitutional:       General: He is not in acute distress.     Appearance: He is well-developed.   HENT:      Head: Normocephalic and atraumatic.      Right Ear: External ear normal.      Left Ear: External ear normal.      Nose: Nose normal.   Eyes:      General: No scleral icterus.  Pulmonary:      Effort: Pulmonary effort is normal. No respiratory distress.   Abdominal:      General: There is no distension.      Palpations: Abdomen is soft.   Musculoskeletal:         General: No deformity. Normal range of motion.      Cervical back: Normal range of motion and neck supple.   Skin:     General: Skin is warm.      Findings: No rash.   Neurological:      General: No focal deficit present.      Mental Status: He is alert.      Gait: Gait normal.   Psychiatric:         Mood and Affect: Mood normal.         Results Reviewed       Procedure Component Value Units Date/Time    HS Troponin I 4hr [[id number]]  (Abnormal) Collected: 10/11/24 1841    Lab Status: Final result Specimen: Blood from Arm, Left Updated: 10/11/24 1913     hs TnI 4hr 126 ng/L      Delta 4hr hsTnI -19 ng/L     Magnesium [[id number]]  (Normal) Collected: 10/11/24 1319    Lab Status: Final result Specimen: Blood from Arm, Right Updated: 10/11/24 1709     Magnesium 1.9 mg/dL     Lipid Panel with Direct LDL reflex [[id number]]  (Abnormal) Collected: 10/11/24 1319    Lab Status: Final result Specimen: Blood from Arm, Right Updated: 10/11/24 1647     Cholesterol 144 mg/dL      Triglycerides 107 mg/dL      HDL, Direct 34 mg/dL      LDL Calculated 89 mg/dL     Hemoglobin A1C w/ EAG  Estimation [[id number]] Collected: 10/11/24 1319    Lab Status: In process Specimen: Blood from Arm, Right Updated: 10/11/24 1637    HS Troponin I 2hr [[id number]]  (Abnormal) Collected: 10/11/24 1504    Lab Status: Final result Specimen: Blood from Arm, Right Updated: 10/11/24 1532     hs TnI 2hr 113 ng/L      Delta 2hr hsTnI -32 ng/L     APTT [[id number]]  (Normal) Collected: 10/11/24 1504    Lab Status: Final result Specimen: Blood from Arm, Right Updated: 10/11/24 1525     PTT 31 seconds     Protime-INR [[id number]]  (Abnormal) Collected: 10/11/24 1504    Lab Status: Final result Specimen: Blood from Arm, Right Updated: 10/11/24 1525     Protime 15.9 seconds      INR 1.22    Narrative:      INR Therapeutic Range    Indication                                             INR Range      Atrial Fibrillation                                               2.0-3.0  Hypercoagulable State                                    2.0.2.3  Left Ventricular Asist Device                            2.0-3.0  Mechanical Heart Valve                                  -    Aortic(with afib, MI, embolism, HF, LA enlargement,    and/or coagulopathy)                                     2.0-3.0 (2.5-3.5)     Mitral                                                             2.5-3.5  Prosthetic/Bioprosthetic Heart Valve               2.0-3.0  Venous thromboembolism (VTE: VT, PE        2.0-3.0    B-Type Natriuretic Peptide(BNP) [[id number]]  (Abnormal) Collected: 10/11/24 1319    Lab Status: Final result Specimen: Blood from Arm, Right Updated: 10/11/24 1438     BNP 1,782 pg/mL     D-dimer, quantitative [[id number]]  (Abnormal) Collected: 10/11/24 1319    Lab Status: Final result Specimen: Blood from Arm, Right Updated: 10/11/24 1415     D-Dimer, Quant 1.62 ug/ml FEU     Narrative:      In the evaluation for possible pulmonary embolism, in the appropriate (Well's Score of 4 or less) patient, the age adjusted d-dimer cutoff for this patient can be  calculated as:    Age x 0.01 (in ug/mL) for Age-adjusted D-dimer exclusion threshold for a patient over 50 years.    HS Troponin 0hr (reflex protocol) [[id number]]  (Abnormal) Collected: 10/11/24 1319    Lab Status: Final result Specimen: Blood from Arm, Right Updated: 10/11/24 1357     hs TnI 0hr 145 ng/L     Basic metabolic panel [[id number]] Collected: 10/11/24 1319    Lab Status: Final result Specimen: Blood from Arm, Right Updated: 10/11/24 1351     Sodium 136 mmol/L      Potassium 4.1 mmol/L      Chloride 105 mmol/L      CO2 22 mmol/L      ANION GAP 9 mmol/L      BUN 20 mg/dL      Creatinine 1.06 mg/dL      Glucose 112 mg/dL      Calcium 9.3 mg/dL      eGFR 74 ml/min/1.73sq m     Narrative:      National Kid[employer] guidelines for Chronic Kidney Disease (CKD):     Stage 1 with normal or high GFR (GFR > 90 mL/min/1.73 square meters)    Stage 2 Mild CKD (GFR = 60-89 mL/min/1.73 square meters)    Stage 3A Moderate CKD (GFR = 45-59 mL/min/1.73 square meters)    Stage 3B Moderate CKD (GFR = 30-44 mL/min/1.73 square meters)    Stage 4 Severe CKD (GFR = 15-29 mL/min/1.73 square meters)    Stage 5 End Stage CKD (GFR <15 mL/min/1.73 square meters)  Note: GFR calculation is accurate only with a steady state creatinine    CBC and differential [[id number]]  (Abnormal) Collected: 10/11/24 1319    Lab Status: Final result Specimen: Blood from Arm, Right Updated: 10/11/24 1337     WBC 11.10 Thousand/uL      RBC 4.32 Million/uL      Hemoglobin 12.9 g/dL      Hematocrit 38.9 %      MCV 90 fL      MCH 29.9 pg      MCHC 33.2 g/dL      RDW 13.6 %      MPV 10.6 fL      Platelets 206 Thousands/uL      nRBC 0 /100 WBCs      Segmented % 76 %      Immature Grans % 1 %      Lymphocytes % 14 %      Monocytes % 6 %      Eosinophils Relative 2 %      Basophils Relative 1 %      Absolute Neutrophils 8.53 Thousands/µL      Absolute Immature Grans 0.05 Thousand/uL      Absolute Lymphocytes 1.51 Thousands/µL      Absolute Monocytes  0.71 Thousand/µL      Eosinophils Absolute 0.24 Thousand/µL      Basophils Absolute 0.06 Thousands/µL             CTA ed chest pe study   Final Interpretation by Monroe Rivas MD (10/11 6818)      No pulmonary embolus.      Pulmonary edema. Bilateral pleural effusions with adjacent consolidation probably passive atelectasis. Contrast refluxing into the IVC and hepatic veins suggests right heart systolic dysfunction, recommend echocardiographic correlate.                  Workstation performed: [id number]         X-ray chest 2 views   Final Interpretation by Jennifer Melchor MD (10/11 6434)      Increased interstitial lung markings bilaterally, most pronounced in the perihilar regions suggestive of mild pulmonary edema.         Workstation performed: [id number]             Procedures    ED Medication and Procedure Management   Prior to Admission Medications   Prescriptions Last Dose Informant Patient Reported? Taking?   Armodafinil 250 MG tablet 10/11/2024 Self Yes Yes   Sig: Take 250 mg by mouth as needed   Clenpiq 10-3.5-12 MG-GM -GM/160ML SOLN Not Taking  No No   Sig: AS DIRECTED (1 KIT)   Patient not taking: Reported on 1/7/2022   atorvastatin (LIPITOR) 20 mg tablet Not Taking Self Yes No   Sig: Take 20 mg by mouth daily   Patient not taking: Reported on 1/7/2022   predniSONE 10 mg tablet Not Taking  No No   Sig: Take 6 tabs on day 1, 5 tabs on day 2, 4 tabs on day 3, 3 tabs on day 4, 2 tabs on day 5, and 1 tab on day 6   Patient not taking: Reported on 1/7/2022      Facility-Administered Medications: None     Current Discharge Medication List        CONTINUE these medications which have NOT CHANGED    Details   Armodafinil 250 MG tablet Take 250 mg by mouth as needed      atorvastatin (LIPITOR) 20 mg tablet Take 20 mg by mouth daily      Clenpiq 10-3.5-12 MG-GM -GM/160ML SOLN AS DIRECTED (1 KIT)  Qty: 320 mL, Refills: 0    Associated Diagnoses: History of colon polyps      predniSONE 10 mg tablet Take 6 tabs  on day 1, 5 tabs on day 2, 4 tabs on day 3, 3 tabs on day 4, 2 tabs on day 5, and 1 tab on day 6  Qty: 21 tablet, Refills: 0    Associated Diagnoses: Arm swelling           No discharge procedures on file.  ED SEPSIS DOCUMENTATION   Time reflects when diagnosis was documented in both MDM as applicable and the Disposition within this note       Time User Action Codes Description Comment    10/11/2024  3:24 PM Sean Arizmendi Add [I50.9] CHF (congestive heart failure) ([facility])                  Sean Arizmendi DO  10/11/24 1932

## 2024-10-11 NOTE — PROGRESS NOTES
"  [facility]        NAME: Humphrey Kiser is a 64 y.o. male  : 1960    MRN: [id number]  DATE: 2024  TIME: 12:32 PM    Assessment and Plan   Shortness of breath [R06.02]  1. Shortness of breath  Transfer to other facility        Respiratory rate of 22, O2 sats down to 93% on room air.  Sitting on exam room table uncomfortably with increased work of breathing  EKG shows left atrial enlargement with nonspecific ST and T wave abnormalities.  Rate of 89 bpm  Patient referred to the ED, will be transported via private vehicle with friend Jeremiah cisneros for further workup    Patient Instructions       Follow up with PCP in 3-5 days.  Proceed to  ER if symptoms worsen.    If tests have been performed at [facility], our office will contact you with results if changes need to be made to the care plan discussed with you at the visit.  You can review your full results on [facility].    Chief Complaint     Chief Complaint   Patient presents with    Shortness of Breath     Pt reports sob with onset two months ago that has since progressed. He states onset after finishing a course of Prednisone. Reports difficulty with deep inhalation. States increased with exacerbation. States difficulty climbing stairs. C/o cp at times. Unsure of any cardiac hx. Hx tia . Denies any increased weakness. C/o fatigue.          History of Present Illness       65 y/o M with past medical history of stroke, presents for increased shortness of breath x 2 months.  Patient admits SOB primarily occurs with exertion.  Patient admits that last night last he was afraid to fall asleep because he felt like he was going to suffocate.  Patient admits to increasing cough, shortness of breath, chest pain and palpitations.  Denies any ankle swelling.  Admits he was recommended to get on blood pressure medication \"years ago.  \"Extensive smoking history.  Started smoking at 12 years old.  Was smoking 2 packs/day x 14 years old.  " Quit at 22 years old for 13 years and has been smoking since then.  Does not regularly see doctors so unsure of medical history.        Review of Systems   Review of Systems   Constitutional:  Positive for fatigue. Negative for chills and fever.   Respiratory:  Positive for cough and shortness of breath.    Cardiovascular:  Positive for chest pain and palpitations. Negative for leg swelling.         Current Medications       Current Outpatient Medications:     Armodafinil 250 MG tablet, Take 250 mg by mouth as needed, Disp: , Rfl:     atorvastatin (LIPITOR) 20 mg tablet, Take 20 mg by mouth daily (Patient not taking: Reported on 1/7/2022), Disp: , Rfl:     Clenpiq 10-3.5-12 MG-GM -GM/160ML SOLN, AS DIRECTED (1 KIT) (Patient not taking: Reported on 1/7/2022), Disp: 320 mL, Rfl: 0    predniSONE 10 mg tablet, Take 6 tabs on day 1, 5 tabs on day 2, 4 tabs on day 3, 3 tabs on day 4, 2 tabs on day 5, and 1 tab on day 6 (Patient not taking: Reported on 1/7/2022), Disp: 21 tablet, Rfl: 0    Current Allergies     Allergies as of 10/11/2024 - Reviewed 10/11/2024   Allergen Reaction Noted    Penicillins Hives 07/20/2019            The following portions of the patient's history were reviewed and updated as appropriate: allergies, current medications, past family history, past medical history, past social history, past surgical history and problem list.     Past Medical History:   Diagnosis Date    Colon polyp     Hyperlipidemia     Stroke (HCC)     left sided numbness residual       Past Surgical History:   Procedure Laterality Date    COLONOSCOPY      HERNIA REPAIR Left     inguinal       Family History   Problem Relation Age of Onset    Arthritis Mother     Alcohol abuse Father     Mental illness Father     Alcohol abuse Sister     Alcohol abuse Brother          Medications have been verified.        Objective   BP (!) 158/102 (BP Location: Left arm, Patient Position: Sitting)   Pulse 97   Temp 97.5 °F (36.4 °C)   Resp 22    SpO2 94%   No LMP for male patient.       Physical Exam     Physical Exam  Vitals and nursing note reviewed.   Constitutional:       Comments: Increased work/rate of breathing   HENT:      Head: Normocephalic and atraumatic.   Eyes:      Extraocular Movements: Extraocular movements intact.      Conjunctiva/sclera: Conjunctivae normal.   Cardiovascular:      Rate and Rhythm: Normal rate.   Pulmonary:      Effort: Pulmonary effort is normal.      Breath sounds: Normal breath sounds.   Musculoskeletal:      Right lower leg: No edema.      Left lower leg: No edema.   Neurological:      Mental Status: He is alert.   Psychiatric:         Mood and Affect: Mood normal.         Behavior: Behavior normal.

## 2024-10-11 NOTE — H&P
H&P - Hospitalist   Name: Humphrey Kiser 64 y.o. male I MRN: [id number]  Unit/Bed#: -01 I Date of Admission: 10/11/2024   Date of Service: 10/11/2024 I Hospital Day: 0     Assessment & Plan  Volume overload  Presented to the emergency department due to increasing shortness of breath  CTA PE study: No pulmonary embolus.  Pulmonary edema. Bilateral pleural effusions with adjacent consolidation probably passive atelectasis. Contrast refluxing into the IVC and hepatic veins suggests right heart systolic dysfunction, recommend echocardiographic correlate.  BNP 1782  No prior history of known heart failure, check echocardiogram  S/p IV Lasix x 1  Continue IV lasix 40 mg BID  Consult cardiology  Tobacco abuse   cessation  Continue nicotine patch  Elevated troponin  Troponin noted to be 113, 145  4hr troponin pending  Suspect in setting of volume overload, patient denies chest pain  Monitor on telemetry  History of CVA (cerebrovascular accident)  Continue ASA  Does not take a statin  Check hgba1c, lipid panel  QT prolongation  EKG on admission with QTc 510  Check magnesium  Repeat EKG tomorrow  Avoid QT prolonging agents  Elevated blood pressure reading  Patient with elevated blood pressures since arrival  Suspect in setting of SOB, volume overload  Monitor with IV lasix      VTE Pharmacologic Prophylaxis: VTE Score: 4 Moderate Risk (Score 3-4) - Pharmacological DVT Prophylaxis Ordered: enoxaparin (Lovenox).  Code Status: Level 1 - Full Code   Discussion with family: Updated  (friend) at bedside.    Anticipated Length of Stay: Patient will be admitted on an inpatient basis with an anticipated length of stay of greater than 2 midnights secondary to volume overload.    History of Present Illness   Chief Complaint: Shortness of breath    Humphrey Kiser is a 64 y.o. male with a PMH of tobacco abuse, prior CVA who presents with shortness of breath.    Patient presents to the emergency department  with gradually worsening shortness of breath x 2 months.  Patient states before that he had taken prednisone for the first time but felt that he was having side effects therefore self discontinued.  Has also noted increased mucus production over the last several months.  Noted worsening orthopnea.  Does report tobacco use since age 12 but recently has been cutting back significantly.  Does not take any prescription medications.  Does report prior CVA and takes aspirin, no statin.  Denies chest pain/palpitations, cough, nausea/vomiting, abdominal pain, leg swelling, fever/chills.  Does not regularly follow with medical providers.    Review of Systems   Constitutional:  Positive for fatigue. Negative for chills, fever and unexpected weight change.   HENT:  Negative for congestion, sore throat and trouble swallowing.    Eyes:  Negative for photophobia, pain and visual disturbance.   Respiratory:  Positive for shortness of breath. Negative for cough and wheezing.    Cardiovascular:  Negative for chest pain, palpitations and leg swelling.   Gastrointestinal:  Negative for abdominal pain, constipation, diarrhea, nausea and vomiting.   Endocrine: Negative for polyuria.   Genitourinary:  Negative for difficulty urinating, dysuria, flank pain, hematuria and urgency.   Musculoskeletal:  Negative for back pain, myalgias, neck pain and neck stiffness.   Skin:  Negative for pallor and rash.   Neurological:  Negative for dizziness, tremors, syncope, speech difficulty, weakness, light-headedness and headaches.   Hematological:  Does not bruise/bleed easily.   Psychiatric/Behavioral:  Negative for agitation and confusion.        Historical Information   Past Medical History:   Diagnosis Date    Colon polyp     Hyperlipidemia     Stroke (HCC)     left sided numbness residual     Past Surgical History:   Procedure Laterality Date    COLONOSCOPY      HERNIA REPAIR Left     inguinal     Social History     Tobacco Use    Smoking status:  Every Day     Current packs/day: 0.00     Types: Pipe, Cigarettes     Last attempt to quit: 10/29/2005     Years since quittin.9    Smokeless tobacco: Never    Tobacco comments:     patient is currently using pipe   Vaping Use    Vaping status: Never Used   Substance and Sexual Activity    Alcohol use: Yes     Alcohol/week: 14.0 standard drinks of alcohol     Types: 14 Glasses of wine per week    Drug use: Yes     Frequency: 1.0 times per week     Types: Marijuana    Sexual activity: Yes     Partners: Female     E-Cigarette/Vaping    E-Cigarette Use Never User      E-Cigarette/Vaping Substances    Nicotine No     THC No     CBD No     Flavoring No     Other No     Unknown No      Family History   Problem Relation Age of Onset    Arthritis Mother     Alcohol abuse Father     Mental illness Father     Alcohol abuse Sister     Alcohol abuse Brother      Social History:  Marital Status: Single   Occupation:   Patient Pre-hospital Living Situation: Home  Patient Pre-hospital Level of Mobility: walks  Patient Pre-hospital Diet Restrictions:     Meds/Allergies   Medications Prior to Admission   Medication Sig Dispense Refill Last Dispense    Armodafinil 250 MG tablet Take 250 mg by mouth as needed   Unknown (patient-reported)    atorvastatin (LIPITOR) 20 mg tablet Take 20 mg by mouth daily (Patient not taking: Reported on 2022)   Unknown (patient-reported)    Clenpiq 10-3.5-12 MG-GM -GM/160ML SOLN AS DIRECTED (1 KIT) (Patient not taking: Reported on 2022) 320 mL 0 Unknown (outside pharmacy)    predniSONE 10 mg tablet Take 6 tabs on day 1, 5 tabs on day 2, 4 tabs on day 3, 3 tabs on day 4, 2 tabs on day 5, and 1 tab on day 6 (Patient not taking: Reported on 2022) 21 tablet 0 Unknown (outside pharmacy)     Allergies   Allergen Reactions    Penicillins Hives       Objective :  Temp:  [97.2 °F (36.2 °C)-97.5 °F (36.4 °C)] 97.2 °F (36.2 °C)  HR:  [82-97] 83  BP: (158-176)/(102-111) 165/102  Resp:  [20-22]  22  SpO2:  [94 %-99 %] 96 %  O2 Device: None (Room air)    Physical Exam  Vitals and nursing note reviewed.   Constitutional:       Appearance: Normal appearance.      Comments: No acute distress   HENT:      Head: Normocephalic.   Eyes:      General: No scleral icterus.     Extraocular Movements: Extraocular movements intact.      Conjunctiva/sclera: Conjunctivae normal.   Cardiovascular:      Rate and Rhythm: Normal rate and regular rhythm.      Heart sounds: Normal heart sounds. No murmur heard.  Pulmonary:      Effort: Pulmonary effort is normal. No respiratory distress.      Breath sounds: Decreased breath sounds and rales present.   Abdominal:      General: Bowel sounds are normal.      Palpations: Abdomen is soft.      Tenderness: There is no abdominal tenderness.   Musculoskeletal:      Cervical back: Normal range of motion.      Comments: Ambulating room without difficulty, no edema   Skin:     General: Skin is warm and dry.   Neurological:      Mental Status: He is alert and oriented to person, place, and time.   Psychiatric:         Mood and Affect: Mood normal.         Speech: Speech normal.         Behavior: Behavior normal.         Lines/Drains:            Lab Results: I have reviewed the following results:  Results from last 7 days   Lab Units 10/11/24  1319   WBC Thousand/uL 11.10*   HEMOGLOBIN g/dL 12.9   HEMATOCRIT % 38.9   PLATELETS Thousands/uL 206   SEGS PCT % 76*   LYMPHO PCT % 14   MONO PCT % 6   EOS PCT % 2     Results from last 7 days   Lab Units 10/11/24  1319   SODIUM mmol/L 136   POTASSIUM mmol/L 4.1   CHLORIDE mmol/L 105   CO2 mmol/L 22   BUN mg/dL 20   CREATININE mg/dL 1.06   ANION GAP mmol/L 9   CALCIUM mg/dL 9.3   GLUCOSE RANDOM mg/dL 112     Results from last 7 days   Lab Units 10/11/24  1504   INR  1.22*         Lab Results   Component Value Date    HGBA1C 5.7 04/10/2020           Imaging Results Review: I reviewed radiology reports from this admission including: chest xray and CT  chest.  Other Study Results Review: EKG was reviewed.     Administrative Statements   I have spent a total time of 70 minutes in caring for this patient on the day of the visit/encounter including Diagnostic results, Instructions for management, Patient and family education, Importance of tx compliance, Risk factor reductions, Counseling / Coordination of care, Documenting in the medical record, Reviewing / ordering tests, medicine, procedures  , and Obtaining or reviewing history  .    ** Please Note: This note has been constructed using a voice recognition system. **

## 2024-10-11 NOTE — RESPIRATORY THERAPY NOTE
RT Protocol Note  Humphrey Kiser 64 y.o. male MRN: [id number]  Unit/Bed#: -01 Encounter: [id number]    Assessment    Principal Problem:    Volume overload  Active Problems:    History of CVA (cerebrovascular accident)    Tobacco abuse    Elevated troponin    QT prolongation    Elevated blood pressure reading      Home Pulmonary Medications:  None     Past Medical History:   Diagnosis Date    Colon polyp     Hyperlipidemia     Stroke (HCC)     left sided numbness residual     Social History     Socioeconomic History    Marital status: Single     Spouse name: None    Number of children: None    Years of education: None    Highest education level: None   Occupational History    None   Tobacco Use    Smoking status: Every Day     Current packs/day: 0.00     Types: Pipe, Cigarettes     Last attempt to quit: 10/29/2005     Years since quittin.9    Smokeless tobacco: Never    Tobacco comments:     patient is currently using pipe   Vaping Use    Vaping status: Never Used   Substance and Sexual Activity    Alcohol use: Yes     Alcohol/week: 14.0 standard drinks of alcohol     Types: 14 Glasses of wine per week    Drug use: Yes     Frequency: 1.0 times per week     Types: Marijuana    Sexual activity: Yes     Partners: Female   Other Topics Concern    None   Social History Narrative    None     Social Determinants of Health     Financial Resource Strain: Not on file   Food Insecurity: Not on file   Transportation Needs: Not on file   Physical Activity: Not on file   Stress: Not on file   Social Connections: Not on file   Intimate Partner Violence: Not on file   Housing Stability: Not on file       Subjective         Objective    Physical Exam:   Assessment Type: Assess only  General Appearance: Alert, Awake  Respiratory Pattern: Normal  Chest Assessment: Chest expansion symmetrical  Bilateral Breath Sounds: Clear  Cough: None  O2 Device: Room air    Vitals:  Blood pressure (!) 165/102, pulse 80, temperature (!)  "97.2 °F (36.2 °C), resp. rate 20, height 5' 9\" (1.753 m), weight 77.1 kg (170 lb), SpO2 93%.          Imaging and other studies: Results Review Statement: I reviewed radiology reports from this admission including: chest xray.    O2 Device: Room air     Plan    Respiratory Plan: No distress/Pulmonary history, Discontinue Protocol        Resp Comments: Patient has no pulmonary history. Patient denies SOB. Breath sounds reveals clear bilaterally. Patient has no indication for bronchodilator therapy. Discontinue Q8PRN 1.25mg Xopenex with 0.5mg Atrovent.   "

## 2024-10-11 NOTE — NURSING NOTE
RN review CHF information with pt and friend. RN provided information package and weight scale. Pt understood the information.

## 2024-10-11 NOTE — ASSESSMENT & PLAN NOTE
Presented to the emergency department due to increasing shortness of breath  CTA PE study: No pulmonary embolus.  Pulmonary edema. Bilateral pleural effusions with adjacent consolidation probably passive atelectasis. Contrast refluxing into the IVC and hepatic veins suggests right heart systolic dysfunction, recommend echocardiographic correlate.  BNP 1782  No prior history of known heart failure, check echocardiogram  S/p IV Lasix x 1  Continue IV lasix 40 mg BID  Consult cardiology

## 2024-10-11 NOTE — PLAN OF CARE
Problem: PAIN - ADULT  Goal: Verbalizes/displays adequate comfort level or baseline comfort level  Description: Interventions:  - Encourage patient to monitor pain and request assistance  - Assess pain using appropriate pain scale  - Administer analgesics based on type and severity of pain and evaluate response  - Implement non-pharmacological measures as appropriate and evaluate response  - Consider cultural and social influences on pain and pain management  - Notify physician/advanced practitioner if interventions unsuccessful or patient reports new pain  Outcome: Progressing     Problem: INFECTION - ADULT  Goal: Absence or prevention of progression during hospitalization  Description: INTERVENTIONS:  - Assess and monitor for signs and symptoms of infection  - Monitor lab/diagnostic results  - Monitor all insertion sites, i.e. indwelling lines, tubes, and drains  - Monitor endotracheal if appropriate and nasal secretions for changes in amount and color  - [place] appropriate cooling/warming therapies per order  - Administer medications as ordered  - Instruct and encourage patient and family to use good hand hygiene technique  - Identify and instruct in appropriate isolation precautions for identified infection/condition  Outcome: Progressing  Goal: Absence of fever/infection during neutropenic period  Description: INTERVENTIONS:  - Monitor WBC    Outcome: Progressing     Problem: SAFETY ADULT  Goal: Patient will remain free of falls  Description: INTERVENTIONS:  - Educate patient/family on patient safety including physical limitations  - Instruct patient to call for assistance with activity   - Consult OT/PT to assist with strengthening/mobility   - Keep Call bell within reach  - Keep bed low and locked with side rails adjusted as appropriate  - Keep care items and personal belongings within reach  - Initiate and maintain comfort rounds  - Make Fall Risk Sign visible to staff  - Offer Toileting every 2 Hours,  in advance of need  - Initiate/Maintain shift alarm  - Obtain necessary fall risk management equipment: socks   - Apply yellow socks and bracelet for high fall risk patients  - Consider moving patient to room near nurses station  Outcome: Progressing  Goal: Maintain or return to baseline ADL function  Description: INTERVENTIONS:  -  Assess patient's ability to carry out ADLs; assess patient's baseline for ADL function and identify physical deficits which impact ability to perform ADLs (bathing, care of mouth/teeth, toileting, grooming, dressing, etc.)  - Assess/evaluate cause of self-care deficits   - Assess range of motion  - Assess patient's mobility; develop plan if impaired  - Assess patient's need for assistive devices and provide as appropriate  - Encourage maximum independence but intervene and supervise when necessary  - Involve family in performance of ADLs  - Assess for home care needs following discharge   - Consider OT consult to assist with ADL evaluation and planning for discharge  - Provide patient education as appropriate  Outcome: Progressing  Goal: Maintains/Returns to pre admission functional level  Description: INTERVENTIONS:  - Perform AM-PAC 6 Click Basic Mobility/ Daily Activity assessment daily.  - Set and communicate daily mobility goal to care team and patient/family/caregiver.   - Collaborate with rehabilitation services on mobility goals if consulted  - Perform Range of Motion 2 times a day.  - Reposition patient every 2 hours.  - Dangle patient 2 times a day  - Stand patient 2 times a day  - Ambulate patient 2 times a day  - Out of bed to chair 2 times a day   - Out of bed for meals 2 times a day  - Out of bed for toileting  - Record patient progress and toleration of activity level   Outcome: Progressing     Problem: DISCHARGE PLANNING  Goal: Discharge to home or other facility with appropriate resources  Description: INTERVENTIONS:  - Identify barriers to discharge w/patient and  caregiver  - Arrange for needed discharge resources and transportation as appropriate  - Identify discharge learning needs (meds, wound care, etc.)  - Arrange for interpretive services to assist at discharge as needed  - Refer to Case Management Department for coordinating discharge planning if the patient needs post-hospital services based on physician/advanced practitioner order or complex needs related to functional status, cognitive ability, or social support system  Outcome: Progressing     Problem: Knowledge Deficit  Goal: Patient/family/caregiver demonstrates understanding of disease process, treatment plan, medications, and discharge instructions  Description: Complete learning assessment and assess knowledge base.  Interventions:  - Provide teaching at level of understanding  - Provide teaching via preferred learning methods  Outcome: Progressing     Problem: CARDIOVASCULAR - ADULT  Goal: Maintains optimal cardiac output and hemodynamic stability  Description: INTERVENTIONS:  - Monitor I/O, vital signs and rhythm  - Monitor for S/S and trends of decreased cardiac output  - Administer and titrate ordered vasoactive medications to optimize hemodynamic stability  - Assess quality of pulses, skin color and temperature  - Assess for signs of decreased coronary artery perfusion  - Instruct patient to report change in severity of symptoms  Outcome: Progressing  Goal: Absence of cardiac dysrhythmias or at baseline rhythm  Description: INTERVENTIONS:  - Continuous cardiac monitoring, vital signs, obtain 12 lead EKG if ordered  - Administer antiarrhythmic and heart rate control medications as ordered  - Monitor electrolytes and administer replacement therapy as ordered  Outcome: Progressing     Problem: RESPIRATORY - ADULT  Goal: Achieves optimal ventilation and oxygenation  Description: INTERVENTIONS:  - Assess for changes in respiratory status  - Assess for changes in mentation and behavior  - Position to  facilitate oxygenation and minimize respiratory effort  - Oxygen administered by appropriate delivery if ordered  - Initiate smoking cessation education as indicated  - Encourage broncho-pulmonary hygiene including cough, deep breathe, Incentive Spirometry  - Assess the need for suctioning and aspirate as needed  - Assess and instruct to report SOB or any respiratory difficulty  - Respiratory Therapy support as indicated  Outcome: Progressing

## 2024-10-11 NOTE — ASSESSMENT & PLAN NOTE
Patient with elevated blood pressures since arrival  Suspect in setting of SOB, volume overload  Monitor with IV lasix

## 2024-10-11 NOTE — ASSESSMENT & PLAN NOTE
Troponin noted to be 113, 145  4hr troponin pending  Suspect in setting of volume overload, patient denies chest pain  Monitor on telemetry

## 2024-10-12 LAB
ANION GAP SERPL CALCULATED.3IONS-SCNC: 10 MMOL/L (ref 4–13)
ANION GAP SERPL CALCULATED.3IONS-SCNC: 8 MMOL/L (ref 4–13)
ATRIAL RATE: 79 BPM
BASOPHILS # BLD AUTO: 0.06 THOUSANDS/ΜL (ref 0–0.1)
BASOPHILS NFR BLD AUTO: 1 % (ref 0–1)
BUN SERPL-MCNC: 19 MG/DL (ref 5–25)
BUN SERPL-MCNC: 21 MG/DL (ref 5–25)
CALCIUM SERPL-MCNC: 8.9 MG/DL (ref 8.4–10.2)
CALCIUM SERPL-MCNC: 9.7 MG/DL (ref 8.4–10.2)
CHLORIDE SERPL-SCNC: 102 MMOL/L (ref 96–108)
CHLORIDE SERPL-SCNC: 107 MMOL/L (ref 96–108)
CO2 SERPL-SCNC: 22 MMOL/L (ref 21–32)
CO2 SERPL-SCNC: 27 MMOL/L (ref 21–32)
CREAT SERPL-MCNC: 1.02 MG/DL (ref 0.6–1.3)
CREAT SERPL-MCNC: 1.29 MG/DL (ref 0.6–1.3)
EOSINOPHIL # BLD AUTO: 0.58 THOUSAND/ΜL (ref 0–0.61)
EOSINOPHIL NFR BLD AUTO: 5 % (ref 0–6)
ERYTHROCYTE [DISTWIDTH] IN BLOOD BY AUTOMATED COUNT: 13.7 % (ref 11.6–15.1)
GFR SERPL CREATININE-BSD FRML MDRD: 58 ML/MIN/1.73SQ M
GFR SERPL CREATININE-BSD FRML MDRD: 77 ML/MIN/1.73SQ M
GLUCOSE SERPL-MCNC: 107 MG/DL (ref 65–140)
GLUCOSE SERPL-MCNC: 108 MG/DL (ref 65–140)
HCT VFR BLD AUTO: 37.3 % (ref 36.5–49.3)
HGB BLD-MCNC: 12 G/DL (ref 12–17)
IMM GRANULOCYTES # BLD AUTO: 0.07 THOUSAND/UL (ref 0–0.2)
IMM GRANULOCYTES NFR BLD AUTO: 1 % (ref 0–2)
LYMPHOCYTES # BLD AUTO: 1.88 THOUSANDS/ΜL (ref 0.6–4.47)
LYMPHOCYTES NFR BLD AUTO: 16 % (ref 14–44)
MAGNESIUM SERPL-MCNC: 2 MG/DL (ref 1.9–2.7)
MCH RBC QN AUTO: 29.1 PG (ref 26.8–34.3)
MCHC RBC AUTO-ENTMCNC: 32.2 G/DL (ref 31.4–37.4)
MCV RBC AUTO: 91 FL (ref 82–98)
MONOCYTES # BLD AUTO: 0.83 THOUSAND/ΜL (ref 0.17–1.22)
MONOCYTES NFR BLD AUTO: 7 % (ref 4–12)
NEUTROPHILS # BLD AUTO: 8.62 THOUSANDS/ΜL (ref 1.85–7.62)
NEUTS SEG NFR BLD AUTO: 70 % (ref 43–75)
NRBC BLD AUTO-RTO: 0 /100 WBCS
P AXIS: 7 DEGREES
PLATELET # BLD AUTO: 202 THOUSANDS/UL (ref 149–390)
PMV BLD AUTO: 10.6 FL (ref 8.9–12.7)
POTASSIUM SERPL-SCNC: 3.5 MMOL/L (ref 3.5–5.3)
POTASSIUM SERPL-SCNC: 3.9 MMOL/L (ref 3.5–5.3)
PR INTERVAL: 178 MS
QRS AXIS: 206 DEGREES
QRSD INTERVAL: 102 MS
QT INTERVAL: 280 MS
QTC INTERVAL: 321 MS
RBC # BLD AUTO: 4.12 MILLION/UL (ref 3.88–5.62)
SODIUM SERPL-SCNC: 137 MMOL/L (ref 135–147)
SODIUM SERPL-SCNC: 139 MMOL/L (ref 135–147)
T WAVE AXIS: 115 DEGREES
VENTRICULAR RATE: 79 BPM
WBC # BLD AUTO: 12.04 THOUSAND/UL (ref 4.31–10.16)

## 2024-10-12 PROCEDURE — 83735 ASSAY OF MAGNESIUM: CPT | Performed by: PHYSICIAN ASSISTANT

## 2024-10-12 PROCEDURE — 99222 1ST HOSP IP/OBS MODERATE 55: CPT | Performed by: INTERNAL MEDICINE

## 2024-10-12 PROCEDURE — 80048 BASIC METABOLIC PNL TOTAL CA: CPT | Performed by: INTERNAL MEDICINE

## 2024-10-12 PROCEDURE — 99232 SBSQ HOSP IP/OBS MODERATE 35: CPT | Performed by: INTERNAL MEDICINE

## 2024-10-12 PROCEDURE — 80048 BASIC METABOLIC PNL TOTAL CA: CPT | Performed by: PHYSICIAN ASSISTANT

## 2024-10-12 PROCEDURE — 85025 COMPLETE CBC W/AUTO DIFF WBC: CPT | Performed by: PHYSICIAN ASSISTANT

## 2024-10-12 PROCEDURE — 93005 ELECTROCARDIOGRAM TRACING: CPT

## 2024-10-12 RX ORDER — FUROSEMIDE 10 MG/ML
40 INJECTION INTRAMUSCULAR; INTRAVENOUS
Status: DISCONTINUED | OUTPATIENT
Start: 2024-10-12 | End: 2024-10-14

## 2024-10-12 RX ORDER — SPIRONOLACTONE 25 MG/1
25 TABLET ORAL DAILY
Status: DISCONTINUED | OUTPATIENT
Start: 2024-10-13 | End: 2024-10-12

## 2024-10-12 RX ORDER — POTASSIUM CHLORIDE 1500 MG/1
40 TABLET, EXTENDED RELEASE ORAL ONCE
Status: COMPLETED | OUTPATIENT
Start: 2024-10-12 | End: 2024-10-12

## 2024-10-12 RX ORDER — LOSARTAN POTASSIUM 25 MG/1
25 TABLET ORAL DAILY
Status: DISCONTINUED | OUTPATIENT
Start: 2024-10-12 | End: 2024-10-15

## 2024-10-12 RX ORDER — ATORVASTATIN CALCIUM 40 MG/1
40 TABLET, FILM COATED ORAL
Status: DISCONTINUED | OUTPATIENT
Start: 2024-10-12 | End: 2024-10-15 | Stop reason: HOSPADM

## 2024-10-12 RX ORDER — ARMODAFINIL 250 MG/1
250 TABLET ORAL DAILY
Status: DISCONTINUED | OUTPATIENT
Start: 2024-10-12 | End: 2024-10-15 | Stop reason: HOSPADM

## 2024-10-12 RX ORDER — HYDRALAZINE HYDROCHLORIDE 20 MG/ML
10 INJECTION INTRAMUSCULAR; INTRAVENOUS EVERY 6 HOURS PRN
Status: DISCONTINUED | OUTPATIENT
Start: 2024-10-12 | End: 2024-10-12

## 2024-10-12 RX ORDER — SPIRONOLACTONE 25 MG/1
25 TABLET ORAL DAILY
Status: DISCONTINUED | OUTPATIENT
Start: 2024-10-12 | End: 2024-10-15 | Stop reason: HOSPADM

## 2024-10-12 RX ADMIN — ARMODAFINIL 250 MG: 250 TABLET ORAL at 11:36

## 2024-10-12 RX ADMIN — ENOXAPARIN SODIUM 40 MG: 100 INJECTION SUBCUTANEOUS at 08:56

## 2024-10-12 RX ADMIN — FUROSEMIDE 40 MG: 10 INJECTION, SOLUTION INTRAMUSCULAR; INTRAVENOUS at 17:04

## 2024-10-12 RX ADMIN — ATORVASTATIN CALCIUM 40 MG: 40 TABLET, FILM COATED ORAL at 17:02

## 2024-10-12 RX ADMIN — FUROSEMIDE 40 MG: 10 INJECTION, SOLUTION INTRAVENOUS at 08:56

## 2024-10-12 RX ADMIN — ASPIRIN 81 MG: 81 TABLET, COATED ORAL at 08:55

## 2024-10-12 RX ADMIN — POTASSIUM CHLORIDE 40 MEQ: 1500 TABLET, EXTENDED RELEASE ORAL at 09:51

## 2024-10-12 RX ADMIN — FUROSEMIDE 40 MG: 10 INJECTION, SOLUTION INTRAMUSCULAR; INTRAVENOUS at 21:12

## 2024-10-12 RX ADMIN — SPIRONOLACTONE 25 MG: 25 TABLET ORAL at 17:02

## 2024-10-12 RX ADMIN — LOSARTAN POTASSIUM 25 MG: 25 TABLET, FILM COATED ORAL at 17:03

## 2024-10-12 RX ADMIN — NICOTINE 1 PATCH: 14 PATCH, EXTENDED RELEASE TRANSDERMAL at 08:57

## 2024-10-12 NOTE — ASSESSMENT & PLAN NOTE
Troponin noted to be 145, 113 and 126  Suspect in setting of volume overload, patient denies chest pain  Monitor on telemetry  Continue medical management

## 2024-10-12 NOTE — CONSULTS
Cardiology   Humphrey Kiser 64 y.o. male MRN: [id number]  -01 Encounter: [id number]        Reason for Consult / Principal Problem: Shortness of breath    Physician Requesting Consult: Dr. Kuldeep Ness    PCP: Douglas C Shoenberger, MD        Assessment:    Assessment & Plan  Volume overload    History of CVA (cerebrovascular accident)    Tobacco abuse    Elevated troponin    QT prolongation    Elevated blood pressure reading           Plan:    -Check daily standing weights   -Strict intake and output monitoring  -Will attempt to uptitrate guideline directed therapy as detailed below  -Close monitoring of electrolytes, keep K greater than 4, mag greater than 2  -Check echocardiogram-on bedside ultrasound his EF appears reduced, IVC is distended.  This is likely heart failure with reduced ejection fraction.    Therapeutic:  --2g sodium diet  --2000 ml fluid restriction     Neurohormonal Blockade:  --Beta-Blocker: Will initiate once closer to euvolemia  --ACEi, ARB or ARNi: Losartan 25 mg  --Aldosterone Receptor Blocker: Spironolactone 25 mg  --Diuretic:   Lasix 40 mg IV 3 times daily  --SGLT2 inhibitor:     Sudden Cardiac Death Risk Reduction:  --ICD: Echo pending     Electrical Resynchronization:  --Candidacy for BiV device:     Advanced Therapies:              CC: Shortness of breath    [facility]  64 y.o. male presents with worsening shortness of breath.  He has a history of stroke is a 40-pack-year smoker.  Had not been following up after his stroke in 2015.  Started to get progressively short of breath over the last few weeks, could not lay down, was breaking out in sweats when he laid down and having orthopnea and paroxysmal nocturnal dyspnea.  He denies any chest pain prior to admission or currently.    Upon presentation to the hospital he had a CT for pulmonary embolism which was negative but it showed bilateral pleural effusions, pulmonary edema, bibasilar atelectasis and contrast refluxing into the  "IVC.    He has an echo ordered.  He was placed on 40 mg IV twice daily of Lasix, he has been urinating and feels a little better but is still short of breath and orthopneic      The patient denies chest pain, palpitations, syncope, presyncope, diaphoresis, nausea/vomiting       The 10-year ASCVD risk score (Sandip POZO, et al., 2019) is: 23.1%    Values used to calculate the score:      Age: 64 years      Sex: Male      Is Non- : No      Diabetic: No      Tobacco smoker: Yes      Systolic Blood Pressure: 149 mmHg      Is BP treated: Yes      HDL Cholesterol: 34 mg/dL      Total Cholesterol: 144 mg/dL            Physical exam  Objective   Vitals: Blood pressure 149/86, pulse 85, temperature (!) 97.3 °F (36.3 °C), temperature source Oral, resp. rate 19, height 5' 9\" (1.753 m), weight 76.9 kg (169 lb 7.2 oz), SpO2 96%.  Orthostatic Blood Pressures      Flowsheet Row Most Recent Value   Blood Pressure 149/86 filed at 10/12/2024 0859   Patient Position - Orthostatic VS Lying filed at 10/11/2024 2017          General:  AO x3, no acute distress  Cardiac:  S1-S2 normal,  No murmurs. JVP: Elevated  Lungs: Reduced air entry bilaterally  Abdomen:  Soft, nontender, nondistended.  Extremities:  Warm, well perfused, pulses palpable, Edema:absent  Neuro: Grossly nonfocal        ======================================================  TREADMILL STRESS  No results found for this or any previous visit.     ----------------------------------------------------------------------------------------------  NUCLEAR STRESS TEST: No results found for this or any previous visit.    No results found for this or any previous visit.      --------------------------------------------------------------------------------  CATH:  No results found for this or any previous visit.    --------------------------------------------------------------------------------  ECHO:   No results found for this or any previous visit.    No results " found for this or any previous visit.    --------------------------------------------------------------------------------  HOLTER  No results found for this or any previous visit.    --------------------------------------------------------------------------------  CAROTIDS  No results found for this or any previous visit.       [unfilled]   ======================================================          Review of Systems  ROS as noted above, otherwise 12 point review of systems was performed and is negative.     Historical Information   Past Medical History:   Diagnosis Date    Colon polyp     Hyperlipidemia     Stroke (HCC)     left sided numbness residual     Past Surgical History:   Procedure Laterality Date    COLONOSCOPY      HERNIA REPAIR Left     inguinal     Social History     Substance and Sexual Activity   Alcohol Use Yes    Alcohol/week: 14.0 standard drinks of alcohol    Types: 14 Glasses of wine per week     Social History     Substance and Sexual Activity   Drug Use Yes    Frequency: 1.0 times per week    Types: Marijuana     Social History     Tobacco Use   Smoking Status Every Day    Current packs/day: 0.00    Types: Pipe, Cigarettes    Last attempt to quit: 10/29/2005    Years since quittin.9   Smokeless Tobacco Never   Tobacco Comments    patient is currently using pipe     Family History   Problem Relation Age of Onset    Arthritis Mother     Alcohol abuse Father     Mental illness Father     Alcohol abuse Sister     Alcohol abuse Brother        Meds/Allergies   Hospital Medications:   Current Facility-Administered Medications:     acetaminophen (TYLENOL) tablet 650 mg, Q6H PRN    Armodafinil 250 mg, Daily    aspirin (ECOTRIN LOW STRENGTH) EC tablet 81 mg, Daily    enoxaparin (LOVENOX) subcutaneous injection 40 mg, Daily    furosemide (LASIX) injection 40 mg, BID (diuretic)    hydrALAZINE (APRESOLINE) injection 10 mg, Q6H PRN    nicotine (NICODERM CQ) 14 mg/24hr TD 24 hr patch 1 patch,  "Daily  Home Medications:   Medications Prior to Admission:     Armodafinil 250 MG tablet    atorvastatin (LIPITOR) 20 mg tablet    Clenpiq 10-3.5-12 MG-GM -GM/160ML SOLN    predniSONE 10 mg tablet    Allergies   Allergen Reactions    Penicillins Hives         Portions of the record may have been created with voice recognition software.  Occasional wrong words or \"sound a like\" substitutions may have occurred due to the inherent limitations of voice recognition software.  Read the chart carefully and recognize, using context, where substitutions have occurred.                      "

## 2024-10-12 NOTE — PLAN OF CARE
Problem: PAIN - ADULT  Goal: Verbalizes/displays adequate comfort level or baseline comfort level  Description: Interventions:  - Encourage patient to monitor pain and request assistance  - Assess pain using appropriate pain scale  - Administer analgesics based on type and severity of pain and evaluate response  - Implement non-pharmacological measures as appropriate and evaluate response  - Consider cultural and social influences on pain and pain management  - Notify physician/advanced practitioner if interventions unsuccessful or patient reports new pain  Outcome: Progressing     Problem: INFECTION - ADULT  Goal: Absence or prevention of progression during hospitalization  Description: INTERVENTIONS:  - Assess and monitor for signs and symptoms of infection  - Monitor lab/diagnostic results  - Monitor all insertion sites, i.e. indwelling lines, tubes, and drains  - Monitor endotracheal if appropriate and nasal secretions for changes in amount and color  - [place] appropriate cooling/warming therapies per order  - Administer medications as ordered  - Instruct and encourage patient and family to use good hand hygiene technique  - Identify and instruct in appropriate isolation precautions for identified infection/condition  Outcome: Progressing  Goal: Absence of fever/infection during neutropenic period  Description: INTERVENTIONS:  - Monitor WBC    Outcome: Progressing     Problem: SAFETY ADULT  Goal: Patient will remain free of falls  Description: INTERVENTIONS:  - Educate patient/family on patient safety including physical limitations  - Instruct patient to call for assistance with activity   - Consult OT/PT to assist with strengthening/mobility   - Keep Call bell within reach  - Keep bed low and locked with side rails adjusted as appropriate  - Keep care items and personal belongings within reach  - Initiate and maintain comfort rounds  - Make Fall Risk Sign visible to staff  - Offer Toileting every 2 Hours,  in advance of need  - Initiate/Maintain shift alarm  - Obtain necessary fall risk management equipment: socks   - Apply yellow socks and bracelet for high fall risk patients  - Consider moving patient to room near nurses station  Outcome: Progressing  Goal: Maintain or return to baseline ADL function  Description: INTERVENTIONS:  -  Assess patient's ability to carry out ADLs; assess patient's baseline for ADL function and identify physical deficits which impact ability to perform ADLs (bathing, care of mouth/teeth, toileting, grooming, dressing, etc.)  - Assess/evaluate cause of self-care deficits   - Assess range of motion  - Assess patient's mobility; develop plan if impaired  - Assess patient's need for assistive devices and provide as appropriate  - Encourage maximum independence but intervene and supervise when necessary  - Involve family in performance of ADLs  - Assess for home care needs following discharge   - Consider OT consult to assist with ADL evaluation and planning for discharge  - Provide patient education as appropriate  Outcome: Progressing  Goal: Maintains/Returns to pre admission functional level  Description: INTERVENTIONS:  - Perform AM-PAC 6 Click Basic Mobility/ Daily Activity assessment daily.  - Set and communicate daily mobility goal to care team and patient/family/caregiver.   - Collaborate with rehabilitation services on mobility goals if consulted  - Perform Range of Motion 2 times a day.  - Reposition patient every 2 hours.  - Dangle patient 2 times a day  - Stand patient 2 times a day  - Ambulate patient 2 times a day  - Out of bed to chair 2 times a day   - Out of bed for meals 2 times a day  - Out of bed for toileting  - Record patient progress and toleration of activity level   Outcome: Progressing     Problem: DISCHARGE PLANNING  Goal: Discharge to home or other facility with appropriate resources  Description: INTERVENTIONS:  - Identify barriers to discharge w/patient and  caregiver  - Arrange for needed discharge resources and transportation as appropriate  - Identify discharge learning needs (meds, wound care, etc.)  - Arrange for interpretive services to assist at discharge as needed  - Refer to Case Management Department for coordinating discharge planning if the patient needs post-hospital services based on physician/advanced practitioner order or complex needs related to functional status, cognitive ability, or social support system  Outcome: Progressing     Problem: Knowledge Deficit  Goal: Patient/family/caregiver demonstrates understanding of disease process, treatment plan, medications, and discharge instructions  Description: Complete learning assessment and assess knowledge base.  Interventions:  - Provide teaching at level of understanding  - Provide teaching via preferred learning methods  Outcome: Progressing     Problem: CARDIOVASCULAR - ADULT  Goal: Maintains optimal cardiac output and hemodynamic stability  Description: INTERVENTIONS:  - Monitor I/O, vital signs and rhythm  - Monitor for S/S and trends of decreased cardiac output  - Administer and titrate ordered vasoactive medications to optimize hemodynamic stability  - Assess quality of pulses, skin color and temperature  - Assess for signs of decreased coronary artery perfusion  - Instruct patient to report change in severity of symptoms  Outcome: Progressing  Goal: Absence of cardiac dysrhythmias or at baseline rhythm  Description: INTERVENTIONS:  - Continuous cardiac monitoring, vital signs, obtain 12 lead EKG if ordered  - Administer antiarrhythmic and heart rate control medications as ordered  - Monitor electrolytes and administer replacement therapy as ordered  Outcome: Progressing     Problem: RESPIRATORY - ADULT  Goal: Achieves optimal ventilation and oxygenation  Description: INTERVENTIONS:  - Assess for changes in respiratory status  - Assess for changes in mentation and behavior  - Position to  facilitate oxygenation and minimize respiratory effort  - Oxygen administered by appropriate delivery if ordered  - Initiate smoking cessation education as indicated  - Encourage broncho-pulmonary hygiene including cough, deep breathe, Incentive Spirometry  - Assess the need for suctioning and aspirate as needed  - Assess and instruct to report SOB or any respiratory difficulty  - Respiratory Therapy support as indicated  Outcome: Progressing

## 2024-10-12 NOTE — PROGRESS NOTES
Progress Note - Hospitalist   Name: Humphrey Kiser 64 y.o. male I MRN: [id number]  Unit/Bed#: -01 I Date of Admission: 10/11/2024   Date of Service: 10/12/2024 I Hospital Day: 1    Assessment & Plan  Volume overload  Presented to the emergency department due to increasing shortness of breath  CTA PE study: No pulmonary embolus.  Pulmonary edema. Bilateral pleural effusions with adjacent consolidation probably passive atelectasis. Contrast refluxing into the IVC and hepatic veins suggests right heart systolic dysfunction, recommend echocardiographic correlate.  BNP 1782  No prior history of known heart failure, Check echocardiogram  S/p IV Lasix x 1  Continue IV lasix 40 mg BID  Cardiology consult  Trend BMP while diuresing  Tobacco abuse   cessation  Continue nicotine patch  Elevated troponin  Troponin noted to be 145, 113 and 126  Suspect in setting of volume overload, patient denies chest pain  Monitor on telemetry  Continue medical management  History of CVA (cerebrovascular accident)  Continue ASA  Does not take a statin  HbA1c is normal  QT prolongation  EKG on admission with QTc 510  Electrolytes are normal.  Repeat EKG   Avoid QT prolonging agents  Elevated blood pressure reading  Patient with elevated blood pressures since arrival  Suspect in setting of SOB, volume overload  Monitor with IV lasix    Labs & Imaging: Results Review Statement: No pertinent imaging studies reviewed.    VTE Prophylaxis: in place.    Code Status:   Level 1 - Full Code    Patient Centered Rounds: I have performed bedside rounds with nursing staff today.    Mobility:   Basic Mobility Inpatient Raw Score: 23  JH-HLM Goal: 7: Walk 25 feet or more  JH-HLM Achieved: 7: Walk 25 feet or more  JH-HLM Goal NOT achieved. Continue with multidisciplinary rounding and encourage appropriate mobility to improve upon JH-HLM goals.    Discussions with Specialists or Other Care Team Provider: RN    Education and Discussions with  "Family / Patient: Declined family update.  I offered to call    Total Time Spent on Date of Encounter in care of patient: 35 mins. This time was spent on one or more of the following: performing physical exam; counseling and coordination of care; obtaining or reviewing history; documenting in the medical record; reviewing/ordering tests, medications or procedures; communicating with other healthcare professionals and discussing with patient's family/caregivers.    Current Length of Stay: 1 day(s)    Current Patient Status: Inpatient   Certification Statement: The patient will continue to require additional inpatient hospital stay due to see my assessment and plan.     Subjective:   Patient is seen and examined at bedside.  Feels better.  No new complaints.  Afebrile  All other ROS are negative.    Objective:    Vitals: Blood pressure 143/93, pulse 85, temperature (!) 97.3 °F (36.3 °C), temperature source Oral, resp. rate 19, height 5' 9\" (1.753 m), weight 76.9 kg (169 lb 7.2 oz), SpO2 95%.,Body mass index is 25.02 kg/m².  SPO2 RA Rest      Flowsheet Row ED to Hosp-Admission (Current) from 10/11/2024 in [facility] Med Surg Unit   SpO2 95 %   SpO2 Activity At Rest   O2 Device None (Room air)   O2 Flow Rate --          I&O:   Intake/Output Summary (Last 24 hours) at 10/12/2024 0831  Last data filed at 10/12/2024 0504  Gross per 24 hour   Intake 720 ml   Output --   Net 720 ml       Physical Exam:    General- Alert, lying comfortably in bed. Not in any acute distress.  Neck- Supple, No JVD  CVS- regular, S1 and S2 normal  Chest- Bilateral Air entry, basal crackles present  Abdomen- soft, nontender, not distended, no guarding or rigidity, BS+  Extremities-  No pedal edema, No calf tenderness                         Normal ROM in all extremities.  CNS-   Alert, awake and orientedx3. No focal deficits present.    Invasive Devices       Peripheral Intravenous Line  Duration             Peripheral IV " "10/11/24 Distal;Right;Upper;Ventral (anterior) Arm <1 day                          Social History  reviewed  Family History   Problem Relation Age of Onset    Arthritis Mother     Alcohol abuse Father     Mental illness Father     Alcohol abuse Sister     Alcohol abuse Brother     reviewed    Meds:  Current Facility-Administered Medications   Medication Dose Route Frequency Provider Last Rate Last Admin    acetaminophen (TYLENOL) tablet 650 mg  650 mg Oral Q6H PRN Loree Machado PA-C        aspirin (ECOTRIN LOW STRENGTH) EC tablet 81 mg  81 mg Oral Daily Loree Machado PA-C        enoxaparin (LOVENOX) subcutaneous injection 40 mg  40 mg Subcutaneous Daily Loree Machado PA-C        furosemide (LASIX) injection 40 mg  40 mg Intravenous BID (diuretic) Loree Machado PA-C        nicotine (NICODERM CQ) 14 mg/24hr TD 24 hr patch 1 patch  1 patch Transdermal Daily Loree Machado PA-C            Medications Prior to Admission:     Armodafinil 250 MG tablet    atorvastatin (LIPITOR) 20 mg tablet    Clenpiq 10-3.5-12 MG-GM -GM/160ML SOLN    predniSONE 10 mg tablet    Labs:  Results from last 7 days   Lab Units 10/12/24  0534 10/11/24  1319   WBC Thousand/uL 12.04* 11.10*   HEMOGLOBIN g/dL 12.0 12.9   HEMATOCRIT % 37.3 38.9   PLATELETS Thousands/uL 202 206   SEGS PCT % 70 76*   LYMPHO PCT % 16 14   MONO PCT % 7 6   EOS PCT % 5 2     Results from last 7 days   Lab Units 10/12/24  0534 10/11/24  1319   POTASSIUM mmol/L 3.5 4.1   CHLORIDE mmol/L 107 105   CO2 mmol/L 22 22   BUN mg/dL 19 20   CREATININE mg/dL 1.02 1.06   CALCIUM mg/dL 8.9 9.3     No results found for: \"TROPONINI\", \"CKMB\", \"CKTOTAL\"  Results from last 7 days   Lab Units 10/11/24  1504   INR  1.22*     No results found for: \"BLOODCX\", \"URINECX\", \"WOUNDCULT\", \"SPUTUMCULTUR\"      Imaging:  No results found for this or any previous visit.    Results for orders placed during the hospital encounter of 10/11/24    X-ray chest 2 " views    Narrative  CHEST    INDICATION: Chest pain and shortness of breath. History of smoking.    COMPARISON: No similar prior studies available for comparison.    TECHNIQUE: XR CHEST PA AND LATERAL - 6 images. The frontal view was performed utilizing dual energy radiographic technique.    FINDINGS:    There are increased interstitial lung markings bilaterally, most pronounced in the perihilar regions suggestive of mild pulmonary edema. No pleural effusions.  No evidence of pneumothorax.    The cardiac silhouette is at the upper limits of normal in size.    The visualized osseous and soft tissue structures are unremarkable.    Impression  Increased interstitial lung markings bilaterally, most pronounced in the perihilar regions suggestive of mild pulmonary edema.      Workstation performed: [id number]      Last 24 Hours Medication List:   Current Facility-Administered Medications   Medication Dose Route Frequency Provider Last Rate    acetaminophen  650 mg Oral Q6H PRN Loree Machado PA-C      aspirin  81 mg Oral Daily Loree Machado PA-C      enoxaparin  40 mg Subcutaneous Daily Loree Machado PA-C      furosemide  40 mg Intravenous BID (diuretic) Loree Machado PA-C      nicotine  1 patch Transdermal Daily Loree Machado PA-C          Today, Patient Was Seen By: Kuldeep Ness MD    ** Please Note: Dictation voice to text software may have been used in the creation of this document. **

## 2024-10-12 NOTE — UTILIZATION REVIEW
"Initial Clinical Review    Admission: Date/Time/Statement:   Admission Orders (From admission, onward)       Ordered        10/11/24 1525  INPATIENT ADMISSION  Once                          Orders Placed This Encounter   Procedures    INPATIENT ADMISSION     Standing Status:   Standing     Number of Occurrences:   1     Order Specific Question:   Level of Care     Answer:   Med Surg [16]     Order Specific Question:   Estimated length of stay     Answer:   More than 2 Midnights     Order Specific Question:   Certification     Answer:   I certify that inpatient services are medically necessary for this patient for a duration of greater than two midnights. See H&P and MD Progress Notes for additional information about the patient's course of treatment.     ED Arrival Information       Expected   10/11/2024     Arrival   10/11/2024 12:37    Acuity   Urgent              Means of arrival   Walk-In    Escorted by   Friend    Service   Hospitalist    Admission type   Emergency              Arrival complaint   Shortness of breath             Chief Complaint   Patient presents with    Shortness of Breath     Pt states \"I smoke my whole life and it seems that I'm short of breath. Seemed that it started when I took steroids couple months ago. I feel like I'm getting sob way too quick. Even just walking from the parking lot to here.\" Denies cp.       Initial Presentation: 64 y.o. male presents to ED  from home with worsening shortness of breath for past  2 months.  Was on prednisone  but   discontinued  it  D/T side effects.  Has  increased mucus  production  for past several months.  Has worsening orthopnea.  Has  been smoking since age  12, recently cut back significantly. Additional PMH  is  CVA and  takes aspirin.  No regular  PCP F/U.  Labs  show  BNP   1782.   Troponin  113,  145.   Admit  Ip with  Volume  Overload,  elevated troponin,  QT prolongation, Elevated  BP  and tobacco abuse and plan is  monitor labs,  IV lasix, " cardiology consult, tele, nicotine patch  and  aspirin.       Anticipated Length of Stay/Certification Statement:  Patient will be admitted on an inpatient basis with an anticipated length of stay of greater than 2 midnights secondary to volume overload.       Date:   10/12   Day 2:   Continue IV  lasix  BID.  Wait  cardiology  consult.   Check  2 DE.  Feels  improved.    No JVD.  No  pedal edema or calf tenderness.      ED Treatment-Medication Administration from 10/11/2024 1236 to 10/11/2024 1617         Date/Time Order Dose Route Action     10/11/2024 1505 aspirin chewable tablet 324 mg 324 mg Oral Given     10/11/2024 1441 iohexol (OMNIPAQUE) 350 MG/ML injection (MULTI-DOSE) 100 mL 85 mL Intravenous Given     10/11/2024 1546 furosemide (LASIX) injection 40 mg 40 mg Intravenous Given            Scheduled Medications:  Armodafinil, 250 mg, Oral, Daily  aspirin, 81 mg, Oral, Daily  enoxaparin, 40 mg, Subcutaneous, Daily  furosemide, 40 mg, Intravenous, BID (diuretic)  nicotine, 1 patch, Transdermal, Daily      Continuous IV Infusions:     PRN Meds:  acetaminophen, 650 mg, Oral, Q6H PRN  hydrALAZINE, 10 mg, Intravenous, Q6H PRN      ED Triage Vitals   Temperature Pulse Respirations Blood Pressure SpO2 Pain Score   10/11/24 1251 10/11/24 1250 10/11/24 1248 10/11/24 1250 10/11/24 1250 10/11/24 1248   97.5 °F (36.4 °C) 87 20 (!) 176/110 99 % No Pain     Weight (last 2 days)       Date/Time Weight    10/12/24 0500 76.9 (169.45)    10/11/24 1528 77.1 (170)            Vital Signs (last 3 days)       Date/Time Temp Pulse Resp BP MAP (mmHg) SpO2 O2 Device Patient Position - Orthostatic VS Pain    10/12/24 08:59:08 -- 85 -- 149/86 107 96 % -- -- --    10/12/24 0815 -- -- -- -- -- -- None (Room air) -- No Pain    10/11/24 20:17:17 97.3 °F (36.3 °C) 85 19 143/93 110 95 % None (Room air) Lying No Pain    10/11/24 1725 -- 80 20 -- -- 93 % None (Room air) -- --    10/11/24 1700 -- -- -- -- -- -- None (Room air) -- --    10/11/24  16:37:31 97.2 °F (36.2 °C) 83 -- 165/102 123 96 % -- -- --    10/11/24 1634 -- -- -- -- -- -- -- -- No Pain    10/11/24 1508 -- 82 22 164/111 -- 96 % None (Room air) Lying No Pain    10/11/24 1251 97.5 °F (36.4 °C) -- -- -- -- -- -- -- --    10/11/24 1250 -- 87 20 176/110 130 99 % None (Room air) Sitting No Pain    10/11/24 1248 -- -- 20 -- -- -- None (Room air) -- No Pain              Pertinent Labs/Diagnostic Test Results:   Radiology:  CTA ed chest pe study   Final Interpretation by Monroe Rivas MD (10/11 0308)      No pulmonary embolus.      Pulmonary edema. Bilateral pleural effusions with adjacent consolidation probably passive atelectasis. Contrast refluxing into the IVC and hepatic veins suggests right heart systolic dysfunction, recommend echocardiographic correlate.                  Workstation performed: [id number]         X-ray chest 2 views   Final Interpretation by Jennifer Melchor MD (10/11 3781)      Increased interstitial lung markings bilaterally, most pronounced in the perihilar regions suggestive of mild pulmonary edema.         Workstation performed: [id number]           Cardiology:  ECG 12 lead    by Interface, Ris Results In (10/12 0527)        Qtc  510  Results from last 7 days   Lab Units 10/12/24  0534 10/11/24  1319   WBC Thousand/uL 12.04* 11.10*   HEMOGLOBIN g/dL 12.0 12.9   HEMATOCRIT % 37.3 38.9   PLATELETS Thousands/uL 202 206   TOTAL NEUT ABS Thousands/µL 8.62* 8.53*         Results from last 7 days   Lab Units 10/12/24  0534 10/11/24  1319   SODIUM mmol/L 137 136   POTASSIUM mmol/L 3.5 4.1   CHLORIDE mmol/L 107 105   CO2 mmol/L 22 22   ANION GAP mmol/L 8 9   BUN mg/dL 19 20   CREATININE mg/dL 1.02 1.06   EGFR ml/min/1.73sq m 77 74   CALCIUM mg/dL 8.9 9.3   MAGNESIUM mg/dL 2.0 1.9             Results from last 7 days   Lab Units 10/12/24  0534 10/11/24  1319   GLUCOSE RANDOM mg/dL 107 112         Results from last 7 days   Lab Units 10/11/24  1319   HEMOGLOBIN A1C % 5.5   EAG  mg/dl 111       Results from last 7 days   Lab Units 10/11/24  1841 10/11/24  1504 10/11/24  1319   HS TNI 0HR ng/L  --   --  145*   HS TNI 2HR ng/L  --  113*  --    HSTNI D2 ng/L  --  -32  --    HS TNI 4HR ng/L 126*  --   --    HSTNI D4 ng/L -19  --   --      Results from last 7 days   Lab Units 10/11/24  1319   D-DIMER QUANTITATIVE ug/ml FEU 1.62*     Results from last 7 days   Lab Units 10/11/24  1504   PROTIME seconds 15.9*   INR  1.22*   PTT seconds 31                         Results from last 7 days   Lab Units 10/11/24  1319   BNP pg/mL 1,782*                   Admitting Diagnosis: CHF (congestive heart failure) (HCC) [I50.9]  Age/Sex: 64 y.o. male    Network Utilization Review Department  ATTENTION: Please call with any questions or concerns to [phone] and carefully listen to the prompts so that you are directed to the right person. All voicemails are confidential.   For Discharge needs, contact Care Management DC Support Team at [phone] opt. [phone]  Send all requests for admission clinical reviews, approved or denied determinations and any other requests to dedicated fax number below belonging to the campus where the patient is receiving treatment. List of dedicated fax numbers for the Facilities:  FACILITY NAME UR FAX NUMBER   ADMISSION DENIALS (Administrative/Medical Necessity) [phone]   DISCHARGE SUPPORT TEAM (NETWORK) [phone]   PARENT CHILD HEALTH (Maternity/NICU/Pediatrics) [phone]   [facility] [phone]   [facility] [phone]   [facility] [phone]   [facility] [phone]   [facility] [phone]   [facility] [phone]   [facility] [phone]   [facility] [phone]   [facility] [phone]   [facility] [phone]   [facility] [phone]   [facility] [phone]

## 2024-10-12 NOTE — PLAN OF CARE
Problem: SAFETY ADULT  Goal: Patient will remain free of falls  Description: INTERVENTIONS:  - Educate patient/family on patient safety including physical limitations  - Instruct patient to call for assistance with activity   - Consult OT/PT to assist with strengthening/mobility   - Keep Call bell within reach  - Keep bed low and locked with side rails adjusted as appropriate  - Keep care items and personal belongings within reach  - Initiate and maintain comfort rounds  - Make Fall Risk Sign visible to staff  - Offer Toileting every 2 Hours, in advance of need  - Initiate/Maintain shift alarm  - Obtain necessary fall risk management equipment: socks   - Apply yellow socks and bracelet for high fall risk patients  - Consider moving patient to room near nurses station  Outcome: Progressing  Goal: Maintain or return to baseline ADL function  Description: INTERVENTIONS:  -  Assess patient's ability to carry out ADLs; assess patient's baseline for ADL function and identify physical deficits which impact ability to perform ADLs (bathing, care of mouth/teeth, toileting, grooming, dressing, etc.)  - Assess/evaluate cause of self-care deficits   - Assess range of motion  - Assess patient's mobility; develop plan if impaired  - Assess patient's need for assistive devices and provide as appropriate  - Encourage maximum independence but intervene and supervise when necessary  - Involve family in performance of ADLs  - Assess for home care needs following discharge   - Consider OT consult to assist with ADL evaluation and planning for discharge  - Provide patient education as appropriate  Outcome: Progressing  Goal: Maintains/Returns to pre admission functional level  Description: INTERVENTIONS:  - Perform AM-PAC 6 Click Basic Mobility/ Daily Activity assessment daily.  - Set and communicate daily mobility goal to care team and patient/family/caregiver.   - Collaborate with rehabilitation services on mobility goals if  consulted  - Perform Range of Motion 2 times a day.  - Reposition patient every 2 hours.  - Dangle patient 2 times a day  - Stand patient 2 times a day  - Ambulate patient 2 times a day  - Out of bed to chair 2 times a day   - Out of bed for meals 2 times a day  - Out of bed for toileting  - Record patient progress and toleration of activity level   Outcome: Progressing

## 2024-10-12 NOTE — ASSESSMENT & PLAN NOTE
Presented to the emergency department due to increasing shortness of breath  CTA PE study: No pulmonary embolus.  Pulmonary edema. Bilateral pleural effusions with adjacent consolidation probably passive atelectasis. Contrast refluxing into the IVC and hepatic veins suggests right heart systolic dysfunction, recommend echocardiographic correlate.  BNP 1782  No prior history of known heart failure, Check echocardiogram  S/p IV Lasix x 1  Continue IV lasix 40 mg BID  Cardiology consult  Trend BMP while diuresing

## 2024-10-13 LAB
ALBUMIN SERPL BCG-MCNC: 3.9 G/DL (ref 3.5–5)
ALP SERPL-CCNC: 86 U/L (ref 34–104)
ALT SERPL W P-5'-P-CCNC: 25 U/L (ref 7–52)
ANION GAP SERPL CALCULATED.3IONS-SCNC: 10 MMOL/L (ref 4–13)
AST SERPL W P-5'-P-CCNC: 16 U/L (ref 13–39)
BASOPHILS # BLD AUTO: 0.05 THOUSANDS/ΜL (ref 0–0.1)
BASOPHILS NFR BLD AUTO: 0 % (ref 0–1)
BILIRUB SERPL-MCNC: 0.66 MG/DL (ref 0.2–1)
BUN SERPL-MCNC: 22 MG/DL (ref 5–25)
CALCIUM SERPL-MCNC: 9.4 MG/DL (ref 8.4–10.2)
CHLORIDE SERPL-SCNC: 103 MMOL/L (ref 96–108)
CO2 SERPL-SCNC: 26 MMOL/L (ref 21–32)
CREAT SERPL-MCNC: 1.16 MG/DL (ref 0.6–1.3)
EOSINOPHIL # BLD AUTO: 0.61 THOUSAND/ΜL (ref 0–0.61)
EOSINOPHIL NFR BLD AUTO: 5 % (ref 0–6)
ERYTHROCYTE [DISTWIDTH] IN BLOOD BY AUTOMATED COUNT: 13.8 % (ref 11.6–15.1)
GFR SERPL CREATININE-BSD FRML MDRD: 66 ML/MIN/1.73SQ M
GLUCOSE SERPL-MCNC: 98 MG/DL (ref 65–140)
HCT VFR BLD AUTO: 41.5 % (ref 36.5–49.3)
HGB BLD-MCNC: 13.5 G/DL (ref 12–17)
IMM GRANULOCYTES # BLD AUTO: 0.05 THOUSAND/UL (ref 0–0.2)
IMM GRANULOCYTES NFR BLD AUTO: 0 % (ref 0–2)
LYMPHOCYTES # BLD AUTO: 1.67 THOUSANDS/ΜL (ref 0.6–4.47)
LYMPHOCYTES NFR BLD AUTO: 14 % (ref 14–44)
MAGNESIUM SERPL-MCNC: 2 MG/DL (ref 1.9–2.7)
MCH RBC QN AUTO: 29.2 PG (ref 26.8–34.3)
MCHC RBC AUTO-ENTMCNC: 32.5 G/DL (ref 31.4–37.4)
MCV RBC AUTO: 90 FL (ref 82–98)
MONOCYTES # BLD AUTO: 0.79 THOUSAND/ΜL (ref 0.17–1.22)
MONOCYTES NFR BLD AUTO: 7 % (ref 4–12)
NEUTROPHILS # BLD AUTO: 8.5 THOUSANDS/ΜL (ref 1.85–7.62)
NEUTS SEG NFR BLD AUTO: 74 % (ref 43–75)
NRBC BLD AUTO-RTO: 0 /100 WBCS
PLATELET # BLD AUTO: 225 THOUSANDS/UL (ref 149–390)
PMV BLD AUTO: 10.9 FL (ref 8.9–12.7)
POTASSIUM SERPL-SCNC: 3.4 MMOL/L (ref 3.5–5.3)
PROT SERPL-MCNC: 7.3 G/DL (ref 6.4–8.4)
RBC # BLD AUTO: 4.62 MILLION/UL (ref 3.88–5.62)
SODIUM SERPL-SCNC: 139 MMOL/L (ref 135–147)
WBC # BLD AUTO: 11.67 THOUSAND/UL (ref 4.31–10.16)

## 2024-10-13 PROCEDURE — 80053 COMPREHEN METABOLIC PANEL: CPT | Performed by: INTERNAL MEDICINE

## 2024-10-13 PROCEDURE — 85025 COMPLETE CBC W/AUTO DIFF WBC: CPT | Performed by: INTERNAL MEDICINE

## 2024-10-13 PROCEDURE — 83735 ASSAY OF MAGNESIUM: CPT | Performed by: INTERNAL MEDICINE

## 2024-10-13 PROCEDURE — 99232 SBSQ HOSP IP/OBS MODERATE 35: CPT | Performed by: INTERNAL MEDICINE

## 2024-10-13 RX ORDER — POTASSIUM CHLORIDE 1500 MG/1
40 TABLET, EXTENDED RELEASE ORAL ONCE
Status: COMPLETED | OUTPATIENT
Start: 2024-10-13 | End: 2024-10-13

## 2024-10-13 RX ADMIN — ATORVASTATIN CALCIUM 40 MG: 40 TABLET, FILM COATED ORAL at 17:07

## 2024-10-13 RX ADMIN — POTASSIUM CHLORIDE 40 MEQ: 1500 TABLET, EXTENDED RELEASE ORAL at 08:55

## 2024-10-13 RX ADMIN — SPIRONOLACTONE 25 MG: 25 TABLET ORAL at 08:54

## 2024-10-13 RX ADMIN — FUROSEMIDE 40 MG: 10 INJECTION, SOLUTION INTRAMUSCULAR; INTRAVENOUS at 17:07

## 2024-10-13 RX ADMIN — FUROSEMIDE 40 MG: 10 INJECTION, SOLUTION INTRAMUSCULAR; INTRAVENOUS at 06:38

## 2024-10-13 RX ADMIN — ARMODAFINIL 250 MG: 250 TABLET ORAL at 08:53

## 2024-10-13 RX ADMIN — ENOXAPARIN SODIUM 40 MG: 100 INJECTION SUBCUTANEOUS at 08:55

## 2024-10-13 RX ADMIN — LOSARTAN POTASSIUM 25 MG: 25 TABLET, FILM COATED ORAL at 08:54

## 2024-10-13 RX ADMIN — FUROSEMIDE 40 MG: 10 INJECTION, SOLUTION INTRAMUSCULAR; INTRAVENOUS at 12:31

## 2024-10-13 RX ADMIN — ASPIRIN 81 MG: 81 TABLET, COATED ORAL at 08:53

## 2024-10-13 NOTE — PROGRESS NOTES
Progress Note - Hospitalist   Name: Humphrey Kiser 64 y.o. male I MRN: [id number]  Unit/Bed#: -[facility] I Date of Admission: 10/11/2024   Date of Service: 10/13/2024 I Hospital Day: 2    Assessment & Plan  Volume overload  Presented to the emergency department due to increasing shortness of breath  CTA PE study: No pulmonary embolus.  Pulmonary edema. Bilateral pleural effusions with adjacent consolidation probably passive atelectasis. Contrast refluxing into the IVC and hepatic veins suggests right heart systolic dysfunction, recommend echocardiographic correlate.  BNP 1782  No prior history of known heart failure  S/p IV Lasix x 1  Cardiology consult initiated  Cardiology increase Lasix to 40 mg IV 3 times daily.  Continue on Aldactone and Cozaar  Fluid restriction  Daily weight and I&O's  Echocardiogram is pending  Trend BMP while diuresing  Tobacco abuse   cessation  Continue nicotine patch  Elevated troponin  Troponin noted to be 145, 113 and 126  Suspect in setting of volume overload, patient denies chest pain  Monitor on telemetry  Continue medical management with aspirin, statin, Cozaar  History of CVA (cerebrovascular accident)  Continue ASA and statin  HbA1c is normal  QT prolongation  EKG on admission with QTc 510  Electrolytes are normal.  Resolved  Elevated blood pressure reading  Patient with elevated blood pressures since arrival  Suspect in setting of SOB, volume overload  Monitor with IV lasix    Labs & Imaging: Results Review Statement: No pertinent imaging studies reviewed.    VTE Prophylaxis: in place.    Code Status:   Level 1 - Full Code    Patient Centered Rounds: I have performed bedside rounds with nursing staff today.    Mobility:   Basic Mobility Inpatient Raw Score: 23  JH-HLM Goal: 7: Walk 25 feet or more  JH-HLM Achieved: 7: Walk 25 feet or more  JH-HLM Goal NOT achieved. Continue with multidisciplinary rounding and encourage appropriate mobility to improve upon JH-HLM  "goals.    Discussions with Specialists or Other Care Team Provider: Cardiology    Education and Discussions with Family / Patient: Patient declined family update.  I offered to call    Total Time Spent on Date of Encounter in care of patient: 35 mins. This time was spent on one or more of the following: performing physical exam; counseling and coordination of care; obtaining or reviewing history; documenting in the medical record; reviewing/ordering tests, medications or procedures; communicating with other healthcare professionals and discussing with patient's family/caregivers.    Current Length of Stay: 2 day(s)    Current Patient Status: Inpatient   Certification Statement: The patient will continue to require additional inpatient hospital stay due to see my assessment and plan.     Subjective:   Patient is seen and examined at bedside.  Denies any new complaint.  This morning patient was agitated as he was having back pain and did not want to sleep on the bed.  Afebrile.  All other ROS are negative.    Objective:    Vitals: Blood pressure 145/88, pulse 84, temperature 97.5 °F (36.4 °C), temperature source Oral, resp. rate 18, height 5' 9\" (1.753 m), weight 72.7 kg (160 lb 3.2 oz), SpO2 96%.,Body mass index is 23.66 kg/m².  SPO2 RA Rest      Flowsheet Row ED to Hosp-Admission (Current) from 10/11/2024 in [facility] Med Surg Unit   SpO2 96 %   SpO2 Activity At Rest   O2 Device None (Room air)   O2 Flow Rate --          I&O:   Intake/Output Summary (Last 24 hours) at 10/13/2024 0752  Last data filed at 10/13/2024 0450  Gross per 24 hour   Intake 960 ml   Output 5900 ml   Net -4940 ml       Physical Exam:    General- Alert, sitting in chair. Not in any acute distress.  Neck- Supple, No JVD  CVS- regular, S1 and S2 normal  Chest- Bilateral Air entry, some basilar crackles present  Abdomen- soft, nontender, not distended, no guarding or rigidity, BS+  Extremities-  No pedal edema, No calf " tenderness  CNS-   Alert, awake and orientedx3. No focal deficits present.    Invasive Devices       Peripheral Intravenous Line  Duration             Peripheral IV 10/11/24 Distal;Right;Upper;Ventral (anterior) Arm 1 day                          Social History  reviewed  Family History   Problem Relation Age of Onset    Arthritis Mother     Alcohol abuse Father     Mental illness Father     Alcohol abuse Sister     Alcohol abuse Brother     reviewed    Meds:  Current Facility-Administered Medications   Medication Dose Route Frequency Provider Last Rate Last Admin    acetaminophen (TYLENOL) tablet 650 mg  650 mg Oral Q6H PRN Loree Machado PA-C        Armodafinil 250 mg  250 mg Oral Daily Kuldeep Ness MD   250 mg at 10/12/24 1136    aspirin (ECOTRIN LOW STRENGTH) EC tablet 81 mg  81 mg Oral Daily Loree Machado PA-C   81 mg at 10/12/24 0855    atorvastatin (LIPITOR) tablet 40 mg  40 mg Oral Daily With Dinner Arturo Islas MD   40 mg at 10/12/24 1702    enoxaparin (LOVENOX) subcutaneous injection 40 mg  40 mg Subcutaneous Daily Loree Machado PA-C   40 mg at 10/12/24 0856    furosemide (LASIX) injection 40 mg  40 mg Intravenous TID (diuretic) Arturo Islas MD   40 mg at 10/13/24 0638    losartan (COZAAR) tablet 25 mg  25 mg Oral Daily Arturo Islas MD   25 mg at 10/12/24 1703    nicotine (NICODERM CQ) 14 mg/24hr TD 24 hr patch 1 patch  1 patch Transdermal Daily Loree Machado PA-C   1 patch at 10/12/24 0857    potassium chloride (Klor-Con M20) CR tablet 40 mEq  40 mEq Oral Once Kuldeep Ness MD        spironolactone (ALDACTONE) tablet 25 mg  25 mg Oral Daily Arturo Islas MD   25 mg at 10/12/24 1702        Medications Prior to Admission:     Armodafinil 250 MG tablet    atorvastatin (LIPITOR) 20 mg tablet    Clenpiq 10-3.5-12 MG-GM -GM/160ML SOLN    predniSONE 10 mg tablet    Labs:  Results from last 7 days   Lab Units 10/13/24  0444 10/12/24  0534 10/11/24  1319   WBC Thousand/uL  "11.67* 12.04* 11.10*   HEMOGLOBIN g/dL 13.5 12.0 12.9   HEMATOCRIT % 41.5 37.3 38.9   PLATELETS Thousands/uL 225 202 206   SEGS PCT % 74 70 76*   LYMPHO PCT % 14 16 14   MONO PCT % 7 7 6   EOS PCT % 5 5 2     Results from last 7 days   Lab Units 10/13/24  0444 10/12/24  1812 10/12/24  0534   POTASSIUM mmol/L 3.4* 3.9 3.5   CHLORIDE mmol/L 103 102 107   CO2 mmol/L 26 27 22   BUN mg/dL 22 21 19   CREATININE mg/dL 1.16 1.29 1.02   CALCIUM mg/dL 9.4 9.7 8.9   ALK PHOS U/L 86  --   --    ALT U/L 25  --   --    AST U/L 16  --   --      No results found for: \"TROPONINI\", \"CKMB\", \"CKTOTAL\"  Results from last 7 days   Lab Units 10/11/24  1504   INR  1.22*     No results found for: \"BLOODCX\", \"URINECX\", \"WOUNDCULT\", \"SPUTUMCULTUR\"      Imaging:  No results found for this or any previous visit.    Results for orders placed during the hospital encounter of 10/11/24    X-ray chest 2 views    Narrative  CHEST    INDICATION: Chest pain and shortness of breath. History of smoking.    COMPARISON: No similar prior studies available for comparison.    TECHNIQUE: XR CHEST PA AND LATERAL - 6 images. The frontal view was performed utilizing dual energy radiographic technique.    FINDINGS:    There are increased interstitial lung markings bilaterally, most pronounced in the perihilar regions suggestive of mild pulmonary edema. No pleural effusions.  No evidence of pneumothorax.    The cardiac silhouette is at the upper limits of normal in size.    The visualized osseous and soft tissue structures are unremarkable.    Impression  Increased interstitial lung markings bilaterally, most pronounced in the perihilar regions suggestive of mild pulmonary edema.      Workstation performed: [id number]      Last 24 Hours Medication List:   Current Facility-Administered Medications   Medication Dose Route Frequency Provider Last Rate    acetaminophen  650 mg Oral Q6H PRN Loree Machado PA-C      Armodafinil  250 mg Oral Daily Kuldeep Ness MD  "     aspirin  81 mg Oral Daily Loree Machado PA-C      atorvastatin  40 mg Oral Daily With Dinner Arturo Islas MD      enoxaparin  40 mg Subcutaneous Daily Loree Machado PA-C      furosemide  40 mg Intravenous TID (diuretic) Arturo Islas MD      losartan  25 mg Oral Daily Arturo Islas MD      nicotine  1 patch Transdermal Daily Loree Machado PA-C      potassium chloride  40 mEq Oral Once Kuldeep Ness MD      spironolactone  25 mg Oral Daily Arturo Islas MD          Today, Patient Was Seen By: Kuldeep Ness MD    ** Please Note: Dictation voice to text software may have been used in the creation of this document. **

## 2024-10-13 NOTE — ASSESSMENT & PLAN NOTE
Troponin noted to be 145, 113 and 126  Suspect in setting of volume overload, patient denies chest pain  Monitor on telemetry  Continue medical management with aspirin, statin, Cozaar

## 2024-10-13 NOTE — PLAN OF CARE
Problem: PAIN - ADULT  Goal: Verbalizes/displays adequate comfort level or baseline comfort level  Description: Interventions:  - Encourage patient to monitor pain and request assistance  - Assess pain using appropriate pain scale  - Administer analgesics based on type and severity of pain and evaluate response  - Implement non-pharmacological measures as appropriate and evaluate response  - Consider cultural and social influences on pain and pain management  - Notify physician/advanced practitioner if interventions unsuccessful or patient reports new pain  Outcome: Progressing     Problem: INFECTION - ADULT  Goal: Absence or prevention of progression during hospitalization  Description: INTERVENTIONS:  - Assess and monitor for signs and symptoms of infection  - Monitor lab/diagnostic results  - Monitor all insertion sites, i.e. indwelling lines, tubes, and drains  - Monitor endotracheal if appropriate and nasal secretions for changes in amount and color  - [place] appropriate cooling/warming therapies per order  - Administer medications as ordered  - Instruct and encourage patient and family to use good hand hygiene technique  - Identify and instruct in appropriate isolation precautions for identified infection/condition  Outcome: Progressing  Goal: Absence of fever/infection during neutropenic period  Description: INTERVENTIONS:  - Monitor WBC    Outcome: Progressing     Problem: SAFETY ADULT  Goal: Patient will remain free of falls  Description: INTERVENTIONS:  - Educate patient/family on patient safety including physical limitations  - Instruct patient to call for assistance with activity   - Consult OT/PT to assist with strengthening/mobility   - Keep Call bell within reach  - Keep bed low and locked with side rails adjusted as appropriate  - Keep care items and personal belongings within reach  - Initiate and maintain comfort rounds  - Make Fall Risk Sign visible to staff  - Offer Toileting every 2 Hours,  in advance of need  - Initiate/Maintain shift alarm  - Obtain necessary fall risk management equipment: socks   - Apply yellow socks and bracelet for high fall risk patients  - Consider moving patient to room near nurses station  Outcome: Progressing  Goal: Maintain or return to baseline ADL function  Description: INTERVENTIONS:  -  Assess patient's ability to carry out ADLs; assess patient's baseline for ADL function and identify physical deficits which impact ability to perform ADLs (bathing, care of mouth/teeth, toileting, grooming, dressing, etc.)  - Assess/evaluate cause of self-care deficits   - Assess range of motion  - Assess patient's mobility; develop plan if impaired  - Assess patient's need for assistive devices and provide as appropriate  - Encourage maximum independence but intervene and supervise when necessary  - Involve family in performance of ADLs  - Assess for home care needs following discharge   - Consider OT consult to assist with ADL evaluation and planning for discharge  - Provide patient education as appropriate  Outcome: Progressing  Goal: Maintains/Returns to pre admission functional level  Description: INTERVENTIONS:  - Perform AM-PAC 6 Click Basic Mobility/ Daily Activity assessment daily.  - Set and communicate daily mobility goal to care team and patient/family/caregiver.   - Collaborate with rehabilitation services on mobility goals if consulted  - Perform Range of Motion 2 times a day.  - Reposition patient every 2 hours.  - Dangle patient 2 times a day  - Stand patient 2 times a day  - Ambulate patient 2 times a day  - Out of bed to chair 2 times a day   - Out of bed for meals 2 times a day  - Out of bed for toileting  - Record patient progress and toleration of activity level   Outcome: Progressing     Problem: DISCHARGE PLANNING  Goal: Discharge to home or other facility with appropriate resources  Description: INTERVENTIONS:  - Identify barriers to discharge w/patient and  caregiver  - Arrange for needed discharge resources and transportation as appropriate  - Identify discharge learning needs (meds, wound care, etc.)  - Arrange for interpretive services to assist at discharge as needed  - Refer to Case Management Department for coordinating discharge planning if the patient needs post-hospital services based on physician/advanced practitioner order or complex needs related to functional status, cognitive ability, or social support system  Outcome: Progressing     Problem: Knowledge Deficit  Goal: Patient/family/caregiver demonstrates understanding of disease process, treatment plan, medications, and discharge instructions  Description: Complete learning assessment and assess knowledge base.  Interventions:  - Provide teaching at level of understanding  - Provide teaching via preferred learning methods  Outcome: Progressing     Problem: CARDIOVASCULAR - ADULT  Goal: Maintains optimal cardiac output and hemodynamic stability  Description: INTERVENTIONS:  - Monitor I/O, vital signs and rhythm  - Monitor for S/S and trends of decreased cardiac output  - Administer and titrate ordered vasoactive medications to optimize hemodynamic stability  - Assess quality of pulses, skin color and temperature  - Assess for signs of decreased coronary artery perfusion  - Instruct patient to report change in severity of symptoms  Outcome: Progressing  Goal: Absence of cardiac dysrhythmias or at baseline rhythm  Description: INTERVENTIONS:  - Continuous cardiac monitoring, vital signs, obtain 12 lead EKG if ordered  - Administer antiarrhythmic and heart rate control medications as ordered  - Monitor electrolytes and administer replacement therapy as ordered  Outcome: Progressing     Problem: RESPIRATORY - ADULT  Goal: Achieves optimal ventilation and oxygenation  Description: INTERVENTIONS:  - Assess for changes in respiratory status  - Assess for changes in mentation and behavior  - Position to  facilitate oxygenation and minimize respiratory effort  - Oxygen administered by appropriate delivery if ordered  - Initiate smoking cessation education as indicated  - Encourage broncho-pulmonary hygiene including cough, deep breathe, Incentive Spirometry  - Assess the need for suctioning and aspirate as needed  - Assess and instruct to report SOB or any respiratory difficulty  - Respiratory Therapy support as indicated  Outcome: Progressing

## 2024-10-13 NOTE — CONSULTS
TeleConsultation - Behavioral Health   Name: Humphrey Kiser 64 y.o. male I MRN: [id number]  Unit/Bed#: -01 I Date of Admission: 10/11/2024   Date of Service: 10/13/2024 I Hospital Day: 2  Inpatient consult to Psychiatry  Consult performed by: Marly Banda MD  Consult ordered by: Kuldeep Ness MD        Physician Requesting Consult: Kuldeep Ness MD  Principal Problem:Volume overload  Reason for Consult:  Agitation    Assessment & Plan   Patient is a 64-year-old male who presented with shortness of breath, with medical history of volume overload, CVA, elevated blood pressure, QT prolongation, no documented psychiatric hospitalizations, who currently presents with agitation and irritability.  Patient's irritability and agitation is mostly personality based.  Does not appear to be a delirium picture or an acute mood disorder.    DX: Adjustment disorder with behavioral disturbance  R/o Cluster B traits-narcissitic traits    Patient does not meet criteria for inpatient psychiatric treatment at this time.  Patient has capacity to make medical decisions.    TREATMENT PLAN RECOMMENDATIONS:  Medications: Patient has not consented to medication management    PRN for agitation: Haldol 2 mg IV/IM/p.o. to be given with Ativan 1 mg IV/IM/oh for severe agitation.  QTc reviewed     Informed consent for the above medication has been obtained including discussion of the risks, benefits and alternatives: No. Unable to obtain due to patient condition, patient declined medication management however recommendations are for severe agitation.    Disposition: The patient does not currently meet criteria for inpatient psychiatric hospitalization. They deny thoughts or plans for suicide and denies homicidal thoughts or intent. They are not unable to care for themselves due to a mental illness and/or acute psychosis. They are able to adequately participate in care planning with primary team. No criteria for an  "involuntary psychiatric commitment exists at this time.    Legal Status Recommendation:  NA    Multiple Antipsychotic Review: N/A    Psychotherapy/Psychoeducation: N/A to this case.    Other/Medical Work Up and/or treatment modality recommendations: N/A to this case.    Patient Caregiver/Family Education: N/A    Follow-up:  Will sign off, please reconsult if patient's clinical picture changes    Report regarding the above Assessment and Treatment plan was provided to: Dr Ness    History of Present Illness    Patient was highly agitated during interview.  He constantly referred to provider as \"lady.\"  He cursed throughout the assessment stating that writer and clinical staff \"did not understand\" what his concerns are.  However when asked what she may need to feel more comfortable, he did not answer and continue to be rate and insult writer.  When asked about his mood he stated \"I am fucked up because my mother did not let me as a child.\"  He did not directly answer about anxiety or depressed mood but did endorse having pain in his back as the source of his frustration.  He refused to answer questions about review of systems.  When asked about his medical issues he initially did not participate.  However later was able to state that he is waiting for echocardiogram and being \"tortured.\"  It was explained to him that he has capacity to sign out and leave the hospital and should leave if he is feeling tortured.  He did show understanding about the importance of having the echocardiogram.  He did not appear to be responding to internal stimuli, nor did he appear to have paranoid delusions or ideations.     Psychiatric Review Of Systems:  Patient refused to participate in review of systems.  He only stated that he has pain that is untreated/    Historical Information   Past Psychiatric History:   Patient refused to provide information.  As per chart review, no documented psychiatric hospitalizations or current " "psychiatric medications.    Substance Abuse History:  Social History     Tobacco Use    Smoking status: Every Day     Current packs/day: 0.00     Types: Pipe, Cigarettes     Last attempt to quit: 10/29/2005     Years since quittin.9    Smokeless tobacco: Never    Tobacco comments:     patient is currently using pipe   Vaping Use    Vaping status: Never Used   Substance Use Topics    Alcohol use: Yes     Alcohol/week: 14.0 standard drinks of alcohol     Types: 14 Glasses of wine per week    Drug use: Yes     Frequency: 1.0 times per week     Types: Marijuana        Family Psychiatric History:   deferred    Social History:  Patient did not answer.  He did say that \"I see a crappy chiropractor, he does a shitty job\"  Traumatic History:   Abuse:  Did not report  Other Traumatic Events:  Did not report    I have reviewed the patient's PMH, PSH, Social History, Family History, Meds, and Allergies    Meds/Allergies   Current Facility-Administered Medications   Medication Dose Route Frequency Provider Last Rate    acetaminophen  650 mg Oral Q6H PRN Loree Machado PA-C      Armodafinil  250 mg Oral Daily Kuldeep Ness MD      aspirin  81 mg Oral Daily Loree Machado PA-C      atorvastatin  40 mg Oral Daily With Dinner Arturo Islas MD      enoxaparin  40 mg Subcutaneous Daily Loree Machado PA-C      furosemide  40 mg Intravenous TID (diuretic) Arturo Islas MD      losartan  25 mg Oral Daily Arturo Islas MD      nicotine  1 patch Transdermal Daily Loree Machado PA-C      potassium chloride  40 mEq Oral Once Kuldeep Ness MD      spironolactone  25 mg Oral Daily Arturo Islas MD        Allergies   Allergen Reactions    Penicillins Hives       Objective :  Temp:  [97.1 °F (36.2 °C)-97.5 °F (36.4 °C)] 97.5 °F (36.4 °C)  HR:  [73-88] 84  BP: (129-151)/() 145/88  Resp:  [18-22] 18  SpO2:  [92 %-98 %] 96 %  O2 Device: None (Room air)    Mental Status Evaluation:  Appearance:  age " "appropriate   Behavior:  evasive and uncooperative   Speech:  normal pitch and normal volume   Mood:  angry and irritable   Affect:  normal   Language: wnl   Thought Process:  normal, logical, and patient over exaggerates what his goal directed   Associations intact associations   Thought Content:  normal   Perceptual Disturbances: None   Risk Potential: Suicidal Ideations none  Homicidal Ideations none  Potential for Aggression No   Sensorium:  person, place, time/date, and situation   Cognition:  recent and remote memory grossly intact   Consciousness:  alert and awake    Attention: attention span and concentration were age appropriate   Intellect: not examined   Fund of Knowledge: awareness of current events: Not formally tested   Insight:  fair   Judgment: fair however patient may be verbally abusive with narcissistic undertones   Muscle Strength:  Muscle Tone: Not examined    Not examined   Gait/Station: Not observed   Motor Activity: No DIP or TD noted, patient was actively eating breakfast     Lab Results: I have reviewed the following lab results:   .     10/13/24  0444   WBC 11.67*   HGB 13.5   HCT 41.5      SODIUM 139   K 3.4*      CO2 26   BUN 22   CREATININE 1.16   GLUC 98   MG 2.0   AST 16   ALT 25   ALB 3.9   TBILI 0.66   ALKPHOS 86          Lipid Profile:   Lab Results   Component Value Date    CHOLESTEROL 144 10/11/2024    HDL 34 (L) 10/11/2024    TRIG 107 10/11/2024    LDLCALC 89 10/11/2024   Thyroid Studies: No results found for: \"[id number]\", \"T3FREE\", \"FREET4\", \"S3ZQXLD\", \"Y2BWHSO\"  Ammonia: No results found for: \"AMMONIA\"  Drug Levels: No results found for: \"VALPROICTOT\", \"VALPROICACID\", \"LITHIUM\", \"CARBAMAZEPIN\", \"CLOZAPINE\", \"NCLOZIP\"    Imaging Results Review: No pertinent imaging studies reviewed.  Other Study Results Review: EKG was reviewed.     Code Status: Level 1 - Full Code  Advance Directive and Living Will:      Power of :    POLST:      Screenings: "   1. Nutrition Assessment (completed by Staff):   Nutrition  Feeding: Able to feed self  Diet Type: 2 gram sodium diet, Cardiac  Fluid Restrictions: 1800  2. Pain Screening  Pain Assessment  Pain Assessment Tool: 0-10  Pain Score: 0  Patient's Stated Pain Goal: No pain  Hospital Pain Intervention(s): Medication (See MAR), Heat applied, Cold applied, Repositioned, Rest  Multiple Pain Sites: No  3. Suicide Screening  ED Crisis Suicide Risk Assessment:      C-SSRS Screening (Nursing Assessment - recent):    C-SSRS Screening (Nursing Assessment - since last contact):      Suicide Risk Assessment: Not obtained patient did not anticipate an suicide screening    Administrative Statements   I have spent a total time of 45 minutes in caring for this patient on the day of the visit/encounter including Risks and benefits of tx options, Documenting in the medical record, Reviewing / ordering tests, medicine, procedures  , and Obtaining or reviewing history  .

## 2024-10-13 NOTE — NURSING NOTE
"RN went in patient's room about 15 minutes earlier to get his vitals, give his lasix, and adjust his telemetry leads. Patient stated he needed to stretch his legs and try to go to the bathroom, so RN said she would come back later. When RN went back in the room and explained what she needed to do, patient stated, \"I need to get all these damn wires and monitors off me so I can lay flat on the ground and stretch my muscles like I do at home.\" RN attempted to explain why she needed to get his vitals and give his lasix, but patient became verbally abusive and said, \"Nobody is fucking listening to me. I told you I needed to move my body in a way that looks weird to feel better, so let me do it.\" RN stated that she was just worried he would lose balance or fall with the way he was stretching and moving. RN attempted to diffuse the situation, and stated she would be back to check on him and try to get his vitals and give lasix after he stretched so more.  "

## 2024-10-13 NOTE — PLAN OF CARE
Problem: PAIN - ADULT  Goal: Verbalizes/displays adequate comfort level or baseline comfort level  Description: Interventions:  - Encourage patient to monitor pain and request assistance  - Assess pain using appropriate pain scale  - Administer analgesics based on type and severity of pain and evaluate response  - Implement non-pharmacological measures as appropriate and evaluate response  - Consider cultural and social influences on pain and pain management  - Notify physician/advanced practitioner if interventions unsuccessful or patient reports new pain  Outcome: Progressing     Problem: INFECTION - ADULT  Goal: Absence or prevention of progression during hospitalization  Description: INTERVENTIONS:  - Assess and monitor for signs and symptoms of infection  - Monitor lab/diagnostic results  - Monitor all insertion sites, i.e. indwelling lines, tubes, and drains  - Monitor endotracheal if appropriate and nasal secretions for changes in amount and color  - [place] appropriate cooling/warming therapies per order  - Administer medications as ordered  - Instruct and encourage patient and family to use good hand hygiene technique  - Identify and instruct in appropriate isolation precautions for identified infection/condition  Outcome: Progressing  Goal: Absence of fever/infection during neutropenic period  Description: INTERVENTIONS:  - Monitor WBC    Outcome: Progressing     Problem: SAFETY ADULT  Goal: Patient will remain free of falls  Description: INTERVENTIONS:  - Educate patient/family on patient safety including physical limitations  - Instruct patient to call for assistance with activity   - Consult OT/PT to assist with strengthening/mobility   - Keep Call bell within reach  - Keep bed low and locked with side rails adjusted as appropriate  - Keep care items and personal belongings within reach  - Initiate and maintain comfort rounds  - Make Fall Risk Sign visible to staff  - Offer Toileting every 2 Hours,  in advance of need  - Initiate/Maintain shift alarm  - Obtain necessary fall risk management equipment: socks   - Apply yellow socks and bracelet for high fall risk patients  - Consider moving patient to room near nurses station  Outcome: Progressing  Goal: Maintain or return to baseline ADL function  Description: INTERVENTIONS:  -  Assess patient's ability to carry out ADLs; assess patient's baseline for ADL function and identify physical deficits which impact ability to perform ADLs (bathing, care of mouth/teeth, toileting, grooming, dressing, etc.)  - Assess/evaluate cause of self-care deficits   - Assess range of motion  - Assess patient's mobility; develop plan if impaired  - Assess patient's need for assistive devices and provide as appropriate  - Encourage maximum independence but intervene and supervise when necessary  - Involve family in performance of ADLs  - Assess for home care needs following discharge   - Consider OT consult to assist with ADL evaluation and planning for discharge  - Provide patient education as appropriate  Outcome: Progressing  Goal: Maintains/Returns to pre admission functional level  Description: INTERVENTIONS:  - Perform AM-PAC 6 Click Basic Mobility/ Daily Activity assessment daily.  - Set and communicate daily mobility goal to care team and patient/family/caregiver.   - Collaborate with rehabilitation services on mobility goals if consulted  - Perform Range of Motion 2 times a day.  - Reposition patient every 2 hours.  - Dangle patient 2 times a day  - Stand patient 2 times a day  - Ambulate patient 2 times a day  - Out of bed to chair 2 times a day   - Out of bed for meals 2 times a day  - Out of bed for toileting  - Record patient progress and toleration of activity level   Outcome: Progressing     Problem: DISCHARGE PLANNING  Goal: Discharge to home or other facility with appropriate resources  Description: INTERVENTIONS:  - Identify barriers to discharge w/patient and  caregiver  - Arrange for needed discharge resources and transportation as appropriate  - Identify discharge learning needs (meds, wound care, etc.)  - Arrange for interpretive services to assist at discharge as needed  - Refer to Case Management Department for coordinating discharge planning if the patient needs post-hospital services based on physician/advanced practitioner order or complex needs related to functional status, cognitive ability, or social support system  Outcome: Progressing     Problem: Knowledge Deficit  Goal: Patient/family/caregiver demonstrates understanding of disease process, treatment plan, medications, and discharge instructions  Description: Complete learning assessment and assess knowledge base.  Interventions:  - Provide teaching at level of understanding  - Provide teaching via preferred learning methods  Outcome: Progressing     Problem: CARDIOVASCULAR - ADULT  Goal: Maintains optimal cardiac output and hemodynamic stability  Description: INTERVENTIONS:  - Monitor I/O, vital signs and rhythm  - Monitor for S/S and trends of decreased cardiac output  - Administer and titrate ordered vasoactive medications to optimize hemodynamic stability  - Assess quality of pulses, skin color and temperature  - Assess for signs of decreased coronary artery perfusion  - Instruct patient to report change in severity of symptoms  Outcome: Progressing  Goal: Absence of cardiac dysrhythmias or at baseline rhythm  Description: INTERVENTIONS:  - Continuous cardiac monitoring, vital signs, obtain 12 lead EKG if ordered  - Administer antiarrhythmic and heart rate control medications as ordered  - Monitor electrolytes and administer replacement therapy as ordered  Outcome: Progressing     Problem: RESPIRATORY - ADULT  Goal: Achieves optimal ventilation and oxygenation  Description: INTERVENTIONS:  - Assess for changes in respiratory status  - Assess for changes in mentation and behavior  - Position to  facilitate oxygenation and minimize respiratory effort  - Oxygen administered by appropriate delivery if ordered  - Initiate smoking cessation education as indicated  - Encourage broncho-pulmonary hygiene including cough, deep breathe, Incentive Spirometry  - Assess the need for suctioning and aspirate as needed  - Assess and instruct to report SOB or any respiratory difficulty  - Respiratory Therapy support as indicated  Outcome: Progressing

## 2024-10-13 NOTE — NURSING NOTE
"This am nurse was in report and patient was heard screaming, this nurse went in patient room and he demanded to take everything off him. Patient had tele box, willow and IV in place to which he was referring. This nurse attempted multiple times to deescalate the situation with no success. Patient became increasingly agitated and threw his tele box  across the room and then the willow. He demanded that the IV was removed and this nurse took it out while patient continued to scream at the staff. Patient proceeded to lay himself on the floor and was groaning stated \" I feel better now\" Nurse ensured patients safety and notified physician of patients outburst and demand to leave AMA. No new orders at this time, care continues.   "

## 2024-10-13 NOTE — ASSESSMENT & PLAN NOTE
Presented to the emergency department due to increasing shortness of breath  CTA PE study: No pulmonary embolus.  Pulmonary edema. Bilateral pleural effusions with adjacent consolidation probably passive atelectasis. Contrast refluxing into the IVC and hepatic veins suggests right heart systolic dysfunction, recommend echocardiographic correlate.  BNP 1782  No prior history of known heart failure  S/p IV Lasix x 1  Cardiology consult initiated  Cardiology increase Lasix to 40 mg IV 3 times daily.  Continue on Aldactone and Cozaar  Fluid restriction  Daily weight and I&O's  Echocardiogram is pending  Trend STACEY while diuresing

## 2024-10-14 ENCOUNTER — APPOINTMENT (INPATIENT)
Dept: NON INVASIVE DIAGNOSTICS | Facility: HOSPITAL | Age: 64
DRG: 194 | End: 2024-10-14
Payer: COMMERCIAL

## 2024-10-14 PROBLEM — I50.9 CONGESTIVE HEART FAILURE (CHF) (HCC): Status: ACTIVE | Noted: 2024-10-14

## 2024-10-14 LAB
ANION GAP SERPL CALCULATED.3IONS-SCNC: 10 MMOL/L (ref 4–13)
AORTIC ROOT: 3.6 CM
AORTIC VALVE MEAN VELOCITY: 8.3 M/S
APICAL FOUR CHAMBER EJECTION FRACTION: 17 %
ATRIAL RATE: 79 BPM
ATRIAL RATE: 84 BPM
ATRIAL RATE: 87 BPM
ATRIAL RATE: 89 BPM
AV AREA BY CONTINUOUS VTI: 1.6 CM2
AV LVOT MEAN GRADIENT: 1 MMHG
AV LVOT PEAK GRADIENT: 1 MMHG
AV MEAN GRADIENT: 3 MMHG
AV PEAK GRADIENT: 6.4 MMHG
AV VALVE AREA: 1.63 CM2
AV VELOCITY RATIO: 0.38
BASOPHILS # BLD AUTO: 0.06 THOUSANDS/ΜL (ref 0–0.1)
BASOPHILS NFR BLD AUTO: 1 % (ref 0–1)
BSA FOR ECHO PROCEDURE: 1.88 M2
BUN SERPL-MCNC: 28 MG/DL (ref 5–25)
CALCIUM SERPL-MCNC: 9.2 MG/DL (ref 8.4–10.2)
CHLORIDE SERPL-SCNC: 102 MMOL/L (ref 96–108)
CO2 SERPL-SCNC: 25 MMOL/L (ref 21–32)
CREAT SERPL-MCNC: 1.2 MG/DL (ref 0.6–1.3)
DOP CALC AO PEAK VEL: 1.3 M/S
DOP CALC AO VTI: 19.8 CM
DOP CALC LVOT AREA: 4.15 CM2
DOP CALC LVOT CARDIAC OUTPUT: 1.8 L/MIN
DOP CALC LVOT DIAMETER: 2.3 CM
DOP CALC LVOT PEAK VEL VTI: 7.79 CM
DOP CALC LVOT PEAK VEL: 0.5 M/S
DOP CALC LVOT STROKE INDEX: 14.4 ML/M2
DOP CALC LVOT STROKE VOLUME: 32.35 CM3
DOP CALC MV VTI: 22.2 CM
E WAVE DECELERATION TIME: 132 MS
E/A RATIO: 1.17
EOSINOPHIL # BLD AUTO: 0.71 THOUSAND/ΜL (ref 0–0.61)
EOSINOPHIL NFR BLD AUTO: 7 % (ref 0–6)
ERYTHROCYTE [DISTWIDTH] IN BLOOD BY AUTOMATED COUNT: 14 % (ref 11.6–15.1)
FRACTIONAL SHORTENING: 8 (ref 28–44)
GFR SERPL CREATININE-BSD FRML MDRD: 63 ML/MIN/1.73SQ M
GLOBAL LONGITUIDAL STRAIN: -5 %
GLUCOSE SERPL-MCNC: 91 MG/DL (ref 65–140)
HCT VFR BLD AUTO: 42.3 % (ref 36.5–49.3)
HGB BLD-MCNC: 13.5 G/DL (ref 12–17)
IMM GRANULOCYTES # BLD AUTO: 0.05 THOUSAND/UL (ref 0–0.2)
IMM GRANULOCYTES NFR BLD AUTO: 1 % (ref 0–2)
INTERVENTRICULAR SEPTUM IN DIASTOLE (PARASTERNAL SHORT AXIS VIEW): 1.1 CM
INTERVENTRICULAR SEPTUM: 1.1 CM (ref 0.6–1.1)
LA/AORTA RATIO 2D: 1.11
LAAS-AP2: 27.9 CM2
LAAS-AP4: 23.9 CM2
LEFT ATRIUM SIZE: 4 CM
LEFT ATRIUM VOLUME (MOD BIPLANE): 97 ML
LEFT ATRIUM VOLUME INDEX (MOD BIPLANE): 51.6 ML/M2
LEFT INTERNAL DIMENSION IN SYSTOLE: 4.9 CM (ref 2.1–4)
LEFT VENTRICLE DIASTOLIC VOLUME (MOD BIPLANE): 249 ML
LEFT VENTRICLE DIASTOLIC VOLUME INDEX (MOD BIPLANE): 132.4 ML/M2
LEFT VENTRICLE SYSTOLIC VOLUME (MOD BIPLANE): 208 ML
LEFT VENTRICLE SYSTOLIC VOLUME INDEX (MOD BIPLANE): 110.6 ML/M2
LEFT VENTRICULAR INTERNAL DIMENSION IN DIASTOLE: 5.3 CM (ref 3.5–6)
LEFT VENTRICULAR POSTERIOR WALL IN END DIASTOLE: 1.1 CM
LEFT VENTRICULAR STROKE VOLUME: 23 ML
LV EF: 16 %
LVSV (TEICH): 23 ML
LYMPHOCYTES # BLD AUTO: 1.99 THOUSANDS/ΜL (ref 0.6–4.47)
LYMPHOCYTES NFR BLD AUTO: 19 % (ref 14–44)
MCH RBC QN AUTO: 28.7 PG (ref 26.8–34.3)
MCHC RBC AUTO-ENTMCNC: 31.9 G/DL (ref 31.4–37.4)
MCV RBC AUTO: 90 FL (ref 82–98)
MONOCYTES # BLD AUTO: 0.95 THOUSAND/ΜL (ref 0.17–1.22)
MONOCYTES NFR BLD AUTO: 9 % (ref 4–12)
MV E'TISSUE VEL-LAT: 2 CM/S
MV E'TISSUE VEL-SEP: 3 CM/S
MV MEAN GRADIENT: 1 MMHG
MV PEAK A VEL: 0.65 M/S
MV PEAK E VEL: 76 CM/S
MV PEAK GRADIENT: 2 MMHG
MV STENOSIS PRESSURE HALF TIME: 39 MS
MV VALVE AREA BY CONTINUITY EQUATION: 1.46 CM2
MV VALVE AREA P 1/2 METHOD: 5.64
NEUTROPHILS # BLD AUTO: 6.73 THOUSANDS/ΜL (ref 1.85–7.62)
NEUTS SEG NFR BLD AUTO: 63 % (ref 43–75)
NRBC BLD AUTO-RTO: 0 /100 WBCS
P AXIS: 59 DEGREES
P AXIS: 63 DEGREES
P AXIS: 64 DEGREES
P AXIS: 7 DEGREES
PLATELET # BLD AUTO: 241 THOUSANDS/UL (ref 149–390)
PMV BLD AUTO: 11.1 FL (ref 8.9–12.7)
POTASSIUM SERPL-SCNC: 3.4 MMOL/L (ref 3.5–5.3)
PR INTERVAL: 164 MS
PR INTERVAL: 166 MS
PR INTERVAL: 168 MS
PR INTERVAL: 178 MS
QRS AXIS: -19 DEGREES
QRS AXIS: -5 DEGREES
QRS AXIS: 206 DEGREES
QRS AXIS: 4 DEGREES
QRSD INTERVAL: 102 MS
QRSD INTERVAL: 94 MS
QRSD INTERVAL: 96 MS
QRSD INTERVAL: 98 MS
QT INTERVAL: 280 MS
QT INTERVAL: 406 MS
QT INTERVAL: 416 MS
QT INTERVAL: 432 MS
QTC INTERVAL: 321 MS
QTC INTERVAL: 493 MS
QTC INTERVAL: 500 MS
QTC INTERVAL: 510 MS
RBC # BLD AUTO: 4.7 MILLION/UL (ref 3.88–5.62)
RIGHT VENTRICLE ID DIMENSION: 4.2 CM
SL CV LV EF: 20
SL CV PED ECHO LEFT VENTRICLE DIASTOLIC VOLUME (MOD BIPLANE) 2D: 135 ML
SL CV PED ECHO LEFT VENTRICLE SYSTOLIC VOLUME (MOD BIPLANE) 2D: 112 ML
SODIUM SERPL-SCNC: 137 MMOL/L (ref 135–147)
T WAVE AXIS: 115 DEGREES
T WAVE AXIS: 46 DEGREES
T WAVE AXIS: 84 DEGREES
T WAVE AXIS: 92 DEGREES
TRICUSPID ANNULAR PLANE SYSTOLIC EXCURSION: 1.8 CM
VENTRICULAR RATE: 79 BPM
VENTRICULAR RATE: 84 BPM
VENTRICULAR RATE: 87 BPM
VENTRICULAR RATE: 89 BPM
WBC # BLD AUTO: 10.49 THOUSAND/UL (ref 4.31–10.16)

## 2024-10-14 PROCEDURE — 93306 TTE W/DOPPLER COMPLETE: CPT | Performed by: INTERNAL MEDICINE

## 2024-10-14 PROCEDURE — 99233 SBSQ HOSP IP/OBS HIGH 50: CPT | Performed by: STUDENT IN AN ORGANIZED HEALTH CARE EDUCATION/TRAINING PROGRAM

## 2024-10-14 PROCEDURE — 93010 ELECTROCARDIOGRAM REPORT: CPT | Performed by: INTERNAL MEDICINE

## 2024-10-14 PROCEDURE — 85025 COMPLETE CBC W/AUTO DIFF WBC: CPT | Performed by: INTERNAL MEDICINE

## 2024-10-14 PROCEDURE — 93356 MYOCRD STRAIN IMG SPCKL TRCK: CPT | Performed by: INTERNAL MEDICINE

## 2024-10-14 PROCEDURE — 93306 TTE W/DOPPLER COMPLETE: CPT

## 2024-10-14 PROCEDURE — 93356 MYOCRD STRAIN IMG SPCKL TRCK: CPT

## 2024-10-14 PROCEDURE — 80048 BASIC METABOLIC PNL TOTAL CA: CPT | Performed by: INTERNAL MEDICINE

## 2024-10-14 PROCEDURE — 99232 SBSQ HOSP IP/OBS MODERATE 35: CPT | Performed by: INTERNAL MEDICINE

## 2024-10-14 RX ORDER — POLYETHYLENE GLYCOL 3350 17 G/17G
17 POWDER, FOR SOLUTION ORAL DAILY
Status: DISCONTINUED | OUTPATIENT
Start: 2024-10-14 | End: 2024-10-15 | Stop reason: HOSPADM

## 2024-10-14 RX ORDER — TORSEMIDE 20 MG/1
20 TABLET ORAL DAILY
Status: DISCONTINUED | OUTPATIENT
Start: 2024-10-15 | End: 2024-10-15

## 2024-10-14 RX ORDER — POTASSIUM CHLORIDE 1500 MG/1
40 TABLET, EXTENDED RELEASE ORAL ONCE
Status: COMPLETED | OUTPATIENT
Start: 2024-10-14 | End: 2024-10-14

## 2024-10-14 RX ORDER — METOPROLOL SUCCINATE 25 MG/1
25 TABLET, EXTENDED RELEASE ORAL DAILY
Status: DISCONTINUED | OUTPATIENT
Start: 2024-10-14 | End: 2024-10-15

## 2024-10-14 RX ORDER — DOCUSATE SODIUM 100 MG/1
100 CAPSULE, LIQUID FILLED ORAL 2 TIMES DAILY
Status: DISCONTINUED | OUTPATIENT
Start: 2024-10-14 | End: 2024-10-15 | Stop reason: HOSPADM

## 2024-10-14 RX ORDER — SENNOSIDES 8.6 MG
1 TABLET ORAL 2 TIMES DAILY
Status: DISCONTINUED | OUTPATIENT
Start: 2024-10-14 | End: 2024-10-15 | Stop reason: HOSPADM

## 2024-10-14 RX ORDER — MAGNESIUM SULFATE HEPTAHYDRATE 40 MG/ML
2 INJECTION, SOLUTION INTRAVENOUS ONCE
Status: COMPLETED | OUTPATIENT
Start: 2024-10-14 | End: 2024-10-14

## 2024-10-14 RX ADMIN — SENNOSIDES 8.6 MG: 8.6 TABLET, FILM COATED ORAL at 20:15

## 2024-10-14 RX ADMIN — POLYETHYLENE GLYCOL 3350 17 G: 17 POWDER, FOR SOLUTION ORAL at 13:14

## 2024-10-14 RX ADMIN — DOCUSATE SODIUM 100 MG: 100 CAPSULE, LIQUID FILLED ORAL at 20:15

## 2024-10-14 RX ADMIN — METOPROLOL SUCCINATE 25 MG: 25 TABLET, EXTENDED RELEASE ORAL at 13:54

## 2024-10-14 RX ADMIN — ATORVASTATIN CALCIUM 40 MG: 40 TABLET, FILM COATED ORAL at 15:45

## 2024-10-14 RX ADMIN — POTASSIUM CHLORIDE 40 MEQ: 1500 TABLET, EXTENDED RELEASE ORAL at 08:43

## 2024-10-14 RX ADMIN — ARMODAFINIL 250 MG: 250 TABLET ORAL at 09:04

## 2024-10-14 RX ADMIN — SENNOSIDES 8.6 MG: 8.6 TABLET, FILM COATED ORAL at 13:14

## 2024-10-14 RX ADMIN — FUROSEMIDE 40 MG: 10 INJECTION, SOLUTION INTRAMUSCULAR; INTRAVENOUS at 11:57

## 2024-10-14 RX ADMIN — DOCUSATE SODIUM 100 MG: 100 CAPSULE, LIQUID FILLED ORAL at 13:14

## 2024-10-14 RX ADMIN — ENOXAPARIN SODIUM 40 MG: 100 INJECTION SUBCUTANEOUS at 08:43

## 2024-10-14 RX ADMIN — NICOTINE 1 PATCH: 14 PATCH, EXTENDED RELEASE TRANSDERMAL at 08:43

## 2024-10-14 RX ADMIN — SPIRONOLACTONE 25 MG: 25 TABLET ORAL at 08:43

## 2024-10-14 RX ADMIN — FUROSEMIDE 40 MG: 10 INJECTION, SOLUTION INTRAMUSCULAR; INTRAVENOUS at 05:28

## 2024-10-14 RX ADMIN — PERFLUTREN 1.2 ML/MIN: 6.52 INJECTION, SUSPENSION INTRAVENOUS at 10:06

## 2024-10-14 RX ADMIN — MAGNESIUM SULFATE HEPTAHYDRATE 2 G: 40 INJECTION, SOLUTION INTRAVENOUS at 08:50

## 2024-10-14 RX ADMIN — ASPIRIN 81 MG: 81 TABLET, COATED ORAL at 08:44

## 2024-10-14 RX ADMIN — LOSARTAN POTASSIUM 25 MG: 25 TABLET, FILM COATED ORAL at 08:44

## 2024-10-14 NOTE — ASSESSMENT & PLAN NOTE
Patient reports a mult year history of smoking tobacco. Has attempted many times to quit. Not interested in quitting at this time.   Plan:  Continue with nicotine patches while in hospital

## 2024-10-14 NOTE — ASSESSMENT & PLAN NOTE
Wt Readings from Last 3 Encounters:   10/14/24 72.6 kg (160 lb)   01/25/22 93.4 kg (206 lb)   01/07/22 94.8 kg (209 lb)

## 2024-10-14 NOTE — ASSESSMENT & PLAN NOTE
Patient reports a 2 month history of progressive exertional shortness of breath.  States worse prior to admission, progressing to SOB at rest.  Echo showing new diagnosis of HFrEF

## 2024-10-14 NOTE — UTILIZATION REVIEW
NOTIFICATION OF INPATIENT ADMISSION   AUTHORIZATION REQUEST   SERVICING FACILITY:   [place] [address]  Tax ID: [id number]  NPI: [id number] ATTENDING PROVIDER:  Attending Name and NPI#: Edel Dorsey Md [[id number]]  Address: [address] [place] [address]  Phone: [phone]   ADMISSION INFORMATION:  Place of Service: Inpatient [facility]  Place of Service Code: 21  Inpatient Admission Date/Time: 10/11/24  3:25 PM  Discharge Date/Time: No discharge date for patient encounter.  Admitting Diagnosis Code/Description:  CHF (congestive heart failure) ([facility]) [I50.9]     UTILIZATION REVIEW CONTACT:  Yelena Taylor, Utilization   Network Utilization Review Department  Phone: [phone]  Fax: [phone]  Email: [email or web address]  Contact for approvals/pending authorizations, clinical reviews, and discharge.     PHYSICIAN ADVISORY SERVICES:  Medical Necessity Denial & Kxdh-wu-Hvwp Review  Phone: [phone]  Fax: [phone]  Email: [email or web address]     DISCHARGE SUPPORT TEAM:  For Patients Discharge Needs & Updates  Phone: [phone] opt. 2 Fax: [phone]  Email: [email or web address]

## 2024-10-14 NOTE — CASE MANAGEMENT
Case Management Assessment & Discharge Planning Note    Patient name Humphrey Kiser  Location /-01 MRN [id number]  : 1960 Date 10/14/2024       Current Admission Date: 10/11/2024  Current Admission Diagnosis:Volume overload   Patient Active Problem List    Diagnosis Date Noted Date Diagnosed    Congestive heart failure (CHF) (HCC) 10/14/2024     Volume overload 10/11/2024     Tobacco abuse 10/11/2024     Elevated troponin 10/11/2024     QT prolongation 10/11/2024     Elevated blood pressure reading 10/11/2024     Mixed hyperlipidemia 10/29/2020     History of CVA (cerebrovascular accident) 10/29/2020     Current episode of major depressive disorder without prior episode 10/29/2020       LOS (days): 3  Geometric Mean LOS (GMLOS) (days):   Days to GMLOS:     OBJECTIVE:    Risk of Unplanned Readmission Score: 12.24         Current admission status: Inpatient       Preferred Pharmacy:   [facility] - [place], [place] - [address]  [address]  [facility] [address]  Phone: [phone] Fax: [phone]    [facility]  [facility] [place] - [address]  [address]  [facility] [address]  Phone: [phone] Fax: [phone]    Primary Care Provider: Douglas C Shoenberger, MD    Primary Insurance: [id number]  Secondary Insurance:     ASSESSMENT:  Active Health Care Proxies       Jeremiah Beaulieu Health Care Representative - Friend   Primary Phone: [phone] (Mobile)                 Advance Directives  Does patient have a Health Care POA?: No  Was patient offered paperwork?: Yes (declined)  Does patient currently have a Health Care decision maker?: Yes, please see Health Care Proxy section  Does patient have Advance Directives?: No  Was patient offered paperwork?: Yes (declined)  Primary Contact: Jeremiah Beaulieu: friend: [phone]    Readmission Root Cause  30 Day Readmission: No    Patient Information  Admitted from::  Home  Mental Status: Alert  During Assessment patient was accompanied by: Not accompanied during assessment  Assessment information provided by:: Patient  Primary Caregiver: Self  Support Systems: Self, Friend  County of Residence: [place]  What city do you live in?: [place]  Home entry access options. Select all that apply.: Stairs  Number of steps to enter home.: 2  Do the steps have railings?: No  Type of Current Residence: 2 story home  Upon entering residence, is there a bedroom on the main floor (no further steps)?: No  A bedroom is located on the following floor levels of residence (select all that apply):: 2nd Floor  Upon entering residence, is there a bathroom on the main floor (no further steps)?: Yes  Number of steps to 2nd floor from main floor: One Flight  Living Arrangements: Lives Alone  Is patient a ?: No    Activities of Daily Living Prior to Admission  Functional Status: Independent  Completes ADLs independently?: Yes  Ambulates independently?: Yes  Does patient use assisted devices?: No  Does patient currently own DME?: Yes  What DME does the patient currently own?: Nebulizer  Does patient have a history of Outpatient Therapy (PT/OT)?: No  Does the patient have a history of Short-Term Rehab?: No  Does patient have a history of HHC?: No  Does patient currently have HHC?: No         Patient Information Continued  Income Source: SSI/SSD  Does patient have prescription coverage?: Yes  Does patient receive dialysis treatments?: No  Does patient have a history of substance abuse?: No  Does patient have a history of Mental Health Diagnosis?: Yes (depressive disorder)  Has patient received inpatient treatment related to mental health in the last 2 years?: No    Means of Transportation  Means of Transport to Appts:: Friends      Social Determinants of Health (SDOH)      Flowsheet Row Most Recent Value   Housing Stability    In the last 12 months, was there a time when you were not able to pay the  mortgage or rent on time? N   In the past 12 months, how many times have you moved where you were living? 0   At any time in the past 12 months, were you homeless or living in a shelter (including now)? N   Transportation Needs    In the past 12 months, has lack of transportation kept you from medical appointments or from getting medications? no   In the past 12 months, has lack of transportation kept you from meetings, work, or from getting things needed for daily living? No   Food Insecurity    Within the past 12 months, you worried that your food would run out before you got the money to buy more. Never true   Within the past 12 months, the food you bought just didn't last and you didn't have money to get more. Never true   Utilities    In the past 12 months has the electric, gas, oil, or water company threatened to shut off services in your home? No            DISCHARGE DETAILS:    Discharge planning discussed with:: patient  Freedom of Choice: Yes  Comments - Freedom of Choice: Pt plans to return home and declines discharge needs    Additional Comments: Met with Pt. Pt presents AA&Ox3. Discussed role of case management. Pt lives alone in 2sh, 2 lisa. Independent PTA. Owns walker, cane, wooden crutches. Friends provide transport. Receives groceries delivery from [employer]. Uses [facility] pharmacy in [place] and able to afford medications and also uses [facility]. Pt reports his friend is executor of will. Declines POA and living will and declines information. Denies hx of VNA/SNF. Pt reports plan to return home and declines discharge needs. Pt reports his friend to transport home.

## 2024-10-14 NOTE — DISCHARGE SUMMARY
Discharge Summary - Hospitalist   Name: Humphrey Kiser 64 y.o. male I MRN: [id number]  Unit/Bed#: -01 I Date of Admission: 10/11/2024   Date of Service: 10/15/2024 I Hospital Day: 4     Assessment & Plan  Volume overload  Presented to the emergency department due to increasing shortness of breath  CTA PE study: No pulmonary embolus.  Pulmonary edema. Bilateral pleural effusions with adjacent consolidation probably passive atelectasis. Contrast refluxing into the IVC and hepatic veins suggests right heart systolic dysfunction, recommend echocardiographic correlate.  BNP 1782  No prior history of known heart failure  S/p IV Lasix x 1  Lasix to 40 mg IV 3 times daily transitioned to torsemide 20 mg qd  Initiating toprol XL 50 mg qd  Continue on Aldactone 25 qd   Initiated lisinopril 5 mg qd  Echo reviewed  life vest placed   Fluid restriction  Daily weight and I&O's  Trend BMP while diuresing    F/u with cardio outpt  Tobacco abuse   cessation  Continue nicotine patch  Elevated troponin  Troponin noted to be 145, 113 and 126  Suspect in setting of volume overload, patient denies chest pain  Monitor on telemetry  Continue medical management with aspirin, statin, Cozaar  History of CVA (cerebrovascular accident)  Continue ASA and statin  HbA1c is normal  QT prolongation  EKG on admission with QTc 510  Electrolytes are normal.  Resolved  Elevated blood pressure reading in office without diagnosis of hypertension  Patient with elevated blood pressures since arrival with SBP >160's  Suspect in setting of SOB, volume overload  Improved with regimen stated above  Pt will most likely be on this long term and now has a Dx of primary HTN on discharge  Congestive heart failure (CHF) (HCC)  Wt Readings from Last 3 Encounters:   10/15/24 72.6 kg (160 lb)   01/25/22 93.4 kg (206 lb)   01/07/22 94.8 kg (209 lb)                Medical Problems       Resolved Problems  Date Reviewed: 1/25/2022   None       Discharging  Physician / Practitioner: Edel Dorsey MD  PCP: Douglas C Shoenberger, MD  Admission Date:   Admission Orders (From admission, onward)       Ordered        10/11/24 1525  INPATIENT ADMISSION  Once                          Discharge Date: 10/15/24    Consultations During Hospital Stay:  Cardio  Psych  CM    Procedures Performed:   CTA ed chest pe study   Final Result      No pulmonary embolus.      Pulmonary edema. Bilateral pleural effusions with adjacent consolidation probably passive atelectasis. Contrast refluxing into the IVC and hepatic veins suggests right heart systolic dysfunction, recommend echocardiographic correlate.                  Workstation performed: [id number]         X-ray chest 2 views   Final Result      Increased interstitial lung markings bilaterally, most pronounced in the perihilar regions suggestive of mild pulmonary edema.         Workstation performed: [id number]           10/14/24 ECHO:    Left Ventricle: Left ventricular cavity size is upper normal. Wall thickness is mildly increased. The left ventricular ejection fraction is 20%. Systolic function is severely reduced. Global longitudinal strain is reduced at -5%. There is severe global hypokinesis with regional variation. Diastolic function is moderately abnormal, consistent with grade II (pseudonormal) relaxation.    Right Ventricle: Systolic function is mildly reduced.    Left Atrium: The atrium is moderately dilated.    Aortic Valve: There is mild stenosis.    Mitral Valve: There is mild annular calcification. There is trace regurgitation.    Tricuspid Valve: There is mild regurgitation.     Strain was performed to quantify interventricular dyssynchrony and evaluate components of myocardial function due to LVH, decreased LV fn.. Results from the utilization of Strain Analysis are listed in the report below.    Significant Findings / Test Results:   See above    Incidental Findings:   See above   I reviewed the above mentioned  "incidental findings with the patient and/or family and they expressed understanding.    Test Results Pending at Discharge (will require follow up):   none     Outpatient Tests Requested:  none    Complications:  on 10/13 pt became agitated and psych was consulted    Reason for Admission: achfe/volume overload    Hospital Course:   Humphrey Kiser is a 64 y.o. male patient who originally presented to the hospital on 10/11/2024 due to worsening shortness of breath.  Patient noted to have acute CHF exacerbation and was started on Lasix IV twice daily and cardiology consulted.  Patient noted to have elevated blood pressures on admission.  Patient was continued on home regimen of Aldactone and Cozaar.  Patient showing an LVEF of 20% requiring the LifeVest placement and metoprolol XL initiated.  Blood pressures have remained stable.  Patient transition to Demadex.  He is otherwise remained hemodynamically stable afebrile in no acute respiratory distress.  Patient has been medically cleared for discharge by internal medicine and cardiology.  Patient to follow-up with his PCP cardiology.  He will be following the current medication regimen.  Lipitor 40 daily ASA 81 mg qd torsemide 20 mg daily, Toprol 50 mg daily, lisinopril 5 mg daily and Aldactone 25 mg daily    Hospital Course: No notes on file      Please see above list of diagnoses and related plan for additional information.     Condition at Discharge: stable    Discharge Day Visit / Exam:   Subjective:  Humphrey was seen and examined at bedside.  No acute events overnight.  Discussed plan of care..  Patient eager to leave today.  Vitals: Blood Pressure: 132/84 (10/14/24 2231)  Pulse: 76 (10/14/24 2231)  Temperature: 97.9 °F (36.6 °C) (10/14/24 2231)  Temp Source: Oral (10/14/24 2231)  Respirations: 17 (10/14/24 2231)  Height: 5' 9\" (175.3 cm) (10/14/24 0726)  Weight - Scale: 72.6 kg (160 lb) (10/15/24 7446)  SpO2: 93 % (10/14/24 2231)  Physical Exam  Vitals and " nursing note reviewed.   Constitutional:       General: He is not in acute distress.     Appearance: He is normal weight. He is ill-appearing (chronically).   HENT:      Head: Normocephalic and atraumatic.   Cardiovascular:      Rate and Rhythm: Normal rate and regular rhythm.      Pulses: Normal pulses.      Heart sounds: Normal heart sounds.   Pulmonary:      Effort: Pulmonary effort is normal.      Breath sounds: Normal breath sounds.   Abdominal:      General: Abdomen is flat. Bowel sounds are normal.      Palpations: Abdomen is soft.   Musculoskeletal:      Right lower leg: No edema.      Left lower leg: No edema.   Skin:     General: Skin is warm.   Neurological:      General: No focal deficit present.      Mental Status: He is alert and oriented to person, place, and time.          Discussion with Family: Patient declined call to .     Discharge instructions/Information to patient and family:   See after visit summary for information provided to patient and family.      Provisions for Follow-Up Care:  See after visit summary for information related to follow-up care and any pertinent home health orders.      Mobility at time of Discharge:   Basic Mobility Inpatient Raw Score: 24  JH-HLM Goal: 8: Walk 250 feet or more  JH-HLM Achieved: 8: Walk 250 feet ot more  HLM Goal achieved. Continue to encourage appropriate mobility.     Disposition:   Home    Planned Readmission: no    Discharge Medications:  See after visit summary for reconciled discharge medications provided to patient and/or family.      Administrative Statements   Discharge Statement:  I have spent a total time of 40 minutes in caring for this patient on the day of the visit/encounter. >30 minutes of time was spent on: Diagnostic results, Prognosis, Risks and benefits of tx options, Instructions for management, Patient and family education, Importance of tx compliance, Risk factor reductions, Impressions, Counseling / Coordination of  care, Documenting in the medical record, Reviewing / ordering tests, medicine, procedures  , and Communicating with other healthcare professionals .    **Please Note: This note may have been constructed using a voice recognition system**

## 2024-10-14 NOTE — PLAN OF CARE
Problem: PAIN - ADULT  Goal: Verbalizes/displays adequate comfort level or baseline comfort level  Description: Interventions:  - Encourage patient to monitor pain and request assistance  - Assess pain using appropriate pain scale  - Administer analgesics based on type and severity of pain and evaluate response  - Implement non-pharmacological measures as appropriate and evaluate response  - Consider cultural and social influences on pain and pain management  - Notify physician/advanced practitioner if interventions unsuccessful or patient reports new pain  Outcome: Progressing     Problem: INFECTION - ADULT  Goal: Absence or prevention of progression during hospitalization  Description: INTERVENTIONS:  - Assess and monitor for signs and symptoms of infection  - Monitor lab/diagnostic results  - Monitor all insertion sites, i.e. indwelling lines, tubes, and drains  - Monitor endotracheal if appropriate and nasal secretions for changes in amount and color  - [place] appropriate cooling/warming therapies per order  - Administer medications as ordered  - Instruct and encourage patient and family to use good hand hygiene technique  - Identify and instruct in appropriate isolation precautions for identified infection/condition  Outcome: Progressing  Goal: Absence of fever/infection during neutropenic period  Description: INTERVENTIONS:  - Monitor WBC    Outcome: Progressing     Problem: SAFETY ADULT  Goal: Patient will remain free of falls  Description: INTERVENTIONS:  - Educate patient/family on patient safety including physical limitations  - Instruct patient to call for assistance with activity   - Consult OT/PT to assist with strengthening/mobility   - Keep Call bell within reach  - Keep bed low and locked with side rails adjusted as appropriate  - Keep care items and personal belongings within reach  - Initiate and maintain comfort rounds  - Make Fall Risk Sign visible to staff  - Offer Toileting every 2 Hours,  in advance of need  - Initiate/Maintain shift alarm  - Obtain necessary fall risk management equipment: socks   - Apply yellow socks and bracelet for high fall risk patients  - Consider moving patient to room near nurses station  Outcome: Progressing  Goal: Maintain or return to baseline ADL function  Description: INTERVENTIONS:  -  Assess patient's ability to carry out ADLs; assess patient's baseline for ADL function and identify physical deficits which impact ability to perform ADLs (bathing, care of mouth/teeth, toileting, grooming, dressing, etc.)  - Assess/evaluate cause of self-care deficits   - Assess range of motion  - Assess patient's mobility; develop plan if impaired  - Assess patient's need for assistive devices and provide as appropriate  - Encourage maximum independence but intervene and supervise when necessary  - Involve family in performance of ADLs  - Assess for home care needs following discharge   - Consider OT consult to assist with ADL evaluation and planning for discharge  - Provide patient education as appropriate  Outcome: Progressing  Goal: Maintains/Returns to pre admission functional level  Description: INTERVENTIONS:  - Perform AM-PAC 6 Click Basic Mobility/ Daily Activity assessment daily.  - Set and communicate daily mobility goal to care team and patient/family/caregiver.   - Collaborate with rehabilitation services on mobility goals if consulted  - Perform Range of Motion 2 times a day.  - Reposition patient every 2 hours.  - Dangle patient 2 times a day  - Stand patient 2 times a day  - Ambulate patient 2 times a day  - Out of bed to chair 2 times a day   - Out of bed for meals 2 times a day  - Out of bed for toileting  - Record patient progress and toleration of activity level   Outcome: Progressing     Problem: DISCHARGE PLANNING  Goal: Discharge to home or other facility with appropriate resources  Description: INTERVENTIONS:  - Identify barriers to discharge w/patient and  caregiver  - Arrange for needed discharge resources and transportation as appropriate  - Identify discharge learning needs (meds, wound care, etc.)  - Arrange for interpretive services to assist at discharge as needed  - Refer to Case Management Department for coordinating discharge planning if the patient needs post-hospital services based on physician/advanced practitioner order or complex needs related to functional status, cognitive ability, or social support system  Outcome: Progressing     Problem: Knowledge Deficit  Goal: Patient/family/caregiver demonstrates understanding of disease process, treatment plan, medications, and discharge instructions  Description: Complete learning assessment and assess knowledge base.  Interventions:  - Provide teaching at level of understanding  - Provide teaching via preferred learning methods  Outcome: Progressing     Problem: CARDIOVASCULAR - ADULT  Goal: Maintains optimal cardiac output and hemodynamic stability  Description: INTERVENTIONS:  - Monitor I/O, vital signs and rhythm  - Monitor for S/S and trends of decreased cardiac output  - Administer and titrate ordered vasoactive medications to optimize hemodynamic stability  - Assess quality of pulses, skin color and temperature  - Assess for signs of decreased coronary artery perfusion  - Instruct patient to report change in severity of symptoms  Outcome: Progressing  Goal: Absence of cardiac dysrhythmias or at baseline rhythm  Description: INTERVENTIONS:  - Continuous cardiac monitoring, vital signs, obtain 12 lead EKG if ordered  - Administer antiarrhythmic and heart rate control medications as ordered  - Monitor electrolytes and administer replacement therapy as ordered  Outcome: Progressing     Problem: RESPIRATORY - ADULT  Goal: Achieves optimal ventilation and oxygenation  Description: INTERVENTIONS:  - Assess for changes in respiratory status  - Assess for changes in mentation and behavior  - Position to  facilitate oxygenation and minimize respiratory effort  - Oxygen administered by appropriate delivery if ordered  - Initiate smoking cessation education as indicated  - Encourage broncho-pulmonary hygiene including cough, deep breathe, Incentive Spirometry  - Assess the need for suctioning and aspirate as needed  - Assess and instruct to report SOB or any respiratory difficulty  - Respiratory Therapy support as indicated  Outcome: Progressing

## 2024-10-14 NOTE — ASSESSMENT & PLAN NOTE
Patient with elevated blood pressures since arrival with SBP >160's  Suspect in setting of SOB, volume overload  Improved with regimen stated above  Pt will most likely be on this long term and now has a Dx of primary HTN on discharge

## 2024-10-14 NOTE — ASSESSMENT & PLAN NOTE
Patient with elevated blood pressures since arrival  Suspect in setting of SOB, volume overload  Improved with the regimen stated above

## 2024-10-14 NOTE — ASSESSMENT & PLAN NOTE
Presented to the emergency department due to increasing shortness of breath  CTA PE study: No pulmonary embolus.  Pulmonary edema. Bilateral pleural effusions with adjacent consolidation probably passive atelectasis. Contrast refluxing into the IVC and hepatic veins suggests right heart systolic dysfunction, recommend echocardiographic correlate.  BNP 1782  No prior history of known heart failure  S/p IV Lasix x 1  Lasix to 40 mg IV 3 times daily transitioned to torsemide 20 mg qd  Initiating toprol XL 25mg   Continue on Aldactone and Cozaar  Echo reviewed  Continue life vest  Fluid restriction  Daily weight and I&O's  Trend BMP while diuresing   Discussed with cardio   F/u with cardio outpt

## 2024-10-14 NOTE — ASSESSMENT & PLAN NOTE
Wt Readings from Last 3 Encounters:   10/15/24 72.6 kg (160 lb)   01/25/22 93.4 kg (206 lb)   01/07/22 94.8 kg (209 lb)

## 2024-10-14 NOTE — PLAN OF CARE
Problem: PAIN - ADULT  Goal: Verbalizes/displays adequate comfort level or baseline comfort level  Description: Interventions:  - Encourage patient to monitor pain and request assistance  - Assess pain using appropriate pain scale  - Administer analgesics based on type and severity of pain and evaluate response  - Implement non-pharmacological measures as appropriate and evaluate response  - Consider cultural and social influences on pain and pain management  - Notify physician/advanced practitioner if interventions unsuccessful or patient reports new pain  Outcome: Progressing     Problem: SAFETY ADULT  Goal: Patient will remain free of falls  Description: INTERVENTIONS:  - Educate patient/family on patient safety including physical limitations  - Instruct patient to call for assistance with activity   - Consult OT/PT to assist with strengthening/mobility   - Keep Call bell within reach  - Keep bed low and locked with side rails adjusted as appropriate  - Keep care items and personal belongings within reach  - Initiate and maintain comfort rounds  - Make Fall Risk Sign visible to staff  - Offer Toileting every 2 Hours, in advance of need  - Initiate/Maintain shift alarm  - Obtain necessary fall risk management equipment: socks   - Apply yellow socks and bracelet for high fall risk patients  - Consider moving patient to room near nurses station  Outcome: Progressing  Goal: Maintain or return to baseline ADL function  Description: INTERVENTIONS:  -  Assess patient's ability to carry out ADLs; assess patient's baseline for ADL function and identify physical deficits which impact ability to perform ADLs (bathing, care of mouth/teeth, toileting, grooming, dressing, etc.)  - Assess/evaluate cause of self-care deficits   - Assess range of motion  - Assess patient's mobility; develop plan if impaired  - Assess patient's need for assistive devices and provide as appropriate  - Encourage maximum independence but intervene  and supervise when necessary  - Involve family in performance of ADLs  - Assess for home care needs following discharge   - Consider OT consult to assist with ADL evaluation and planning for discharge  - Provide patient education as appropriate  Outcome: Progressing     Problem: DISCHARGE PLANNING  Goal: Discharge to home or other facility with appropriate resources  Description: INTERVENTIONS:  - Identify barriers to discharge w/patient and caregiver  - Arrange for needed discharge resources and transportation as appropriate  - Identify discharge learning needs (meds, wound care, etc.)  - Arrange for interpretive services to assist at discharge as needed  - Refer to Case Management Department for coordinating discharge planning if the patient needs post-hospital services based on physician/advanced practitioner order or complex needs related to functional status, cognitive ability, or social support system  Outcome: Progressing     Problem: Knowledge Deficit  Goal: Patient/family/caregiver demonstrates understanding of disease process, treatment plan, medications, and discharge instructions  Description: Complete learning assessment and assess knowledge base.  Interventions:  - Provide teaching at level of understanding  - Provide teaching via preferred learning methods  Outcome: Progressing     Problem: CARDIOVASCULAR - ADULT  Goal: Maintains optimal cardiac output and hemodynamic stability  Description: INTERVENTIONS:  - Monitor I/O, vital signs and rhythm  - Monitor for S/S and trends of decreased cardiac output  - Administer and titrate ordered vasoactive medications to optimize hemodynamic stability  - Assess quality of pulses, skin color and temperature  - Assess for signs of decreased coronary artery perfusion  - Instruct patient to report change in severity of symptoms  Outcome: Progressing  Goal: Absence of cardiac dysrhythmias or at baseline rhythm  Description: INTERVENTIONS:  - Continuous cardiac  monitoring, vital signs, obtain 12 lead EKG if ordered  - Administer antiarrhythmic and heart rate control medications as ordered  - Monitor electrolytes and administer replacement therapy as ordered  Outcome: Progressing     Problem: RESPIRATORY - ADULT  Goal: Achieves optimal ventilation and oxygenation  Description: INTERVENTIONS:  - Assess for changes in respiratory status  - Assess for changes in mentation and behavior  - Position to facilitate oxygenation and minimize respiratory effort  - Oxygen administered by appropriate delivery if ordered  - Initiate smoking cessation education as indicated  - Encourage broncho-pulmonary hygiene including cough, deep breathe, Incentive Spirometry  - Assess the need for suctioning and aspirate as needed  - Assess and instruct to report SOB or any respiratory difficulty  - Respiratory Therapy support as indicated  Outcome: Progressing

## 2024-10-14 NOTE — NURSING NOTE
Assumed care of pt at 0730. Pt aaox4, nv intact, perrla, and able to make needs known. Pt indep in room. Pt aware of fluid restriction. Pt denies sob or cp or pain at this time. Pt medicated per MAR tolerating meds whole with fluids. All safeties maintained including call bell and personal belongings within reach. Care continues.

## 2024-10-14 NOTE — ASSESSMENT & PLAN NOTE
Presented to the emergency department due to increasing shortness of breath  CTA PE study: No pulmonary embolus.  Pulmonary edema. Bilateral pleural effusions with adjacent consolidation probably passive atelectasis. Contrast refluxing into the IVC and hepatic veins suggests right heart systolic dysfunction, recommend echocardiographic correlate.  BNP 1782  No prior history of known heart failure  S/p IV Lasix x 1  Lasix to 40 mg IV 3 times daily transitioned to torsemide 20 mg qd  Initiating toprol XL 50 mg qd  Continue on Aldactone 25 qd   Initiated lisinopril 5 mg qd  Echo reviewed  life vest placed   Fluid restriction  Daily weight and I&O's  Trend BMP while diuresing    F/u with cardio outpt

## 2024-10-14 NOTE — ASSESSMENT & PLAN NOTE
New diagnosis for patient   Patient resistant in the past to taking medications, he is unsure at this time if he would be amenable to taking daily medications.   Patient has financial concerns as well - will continue losartan, spirolactone   Echo: EF 20%  Currently on losartan, spironolactone   Lasix currently 40 mg TID   Examines euvolemic     Plan:  Outpatient ischemic workup  Life Vest   Begin Toprol XL 25 mg   Continue losartan,spironolactone   DC lasix  Torsemide 20 mg daily

## 2024-10-14 NOTE — PROGRESS NOTES
Progress Note - Cardiology   Name: Humphrey Kiser 64 y.o. male I MRN: [id number]  Unit/Bed#: -01 I Date of Admission: 10/11/2024   Date of Service: 10/14/2024 I Hospital Day: 3    Assessment & Plan  Congestive heart failure (CHF) (HCC)  New diagnosis for patient   Patient resistant in the past to taking medications, he is unsure at this time if he would be amenable to taking daily medications.   Patient has financial concerns as well - will continue losartan, spirolactone   Echo: EF 20%  Currently on losartan, spironolactone   Lasix currently 40 mg TID   Examines euvolemic     Plan:  Outpatient ischemic workup  Life Vest   Begin Toprol XL 25 mg   Continue losartan,spironolactone   DC lasix  Torsemide 20 mg daily   Volume overload  Patient reports a 2 month history of progressive exertional shortness of breath.  States worse prior to admission, progressing to SOB at rest.  Echo showing new diagnosis of HFrEF   Tobacco abuse  Patient reports a mult year history of smoking tobacco. Has attempted many times to quit. Not interested in quitting at this time.   Plan:  Continue with nicotine patches while in hospital   Elevated troponin  On admission troponin 145, trended down  Patient denies chest pain    Elevated blood pressure reading  Patients -160/80/110's while in hospital.    Plan:  Begin Toprol XL 25 mg daily    Subjective   Chief Complaint: shortness of breath   Patient denies shortness of breath or chest pain at this time.     Objective :  Temp:  [97.3 °F (36.3 °C)-97.8 °F (36.6 °C)] 97.3 °F (36.3 °C)  HR:  [72-74] 74  BP: (114-141)/(81-98) 126/81  Resp:  [18] 18  SpO2:  [94 %] 94 %  Orthostatic Blood Pressures      Flowsheet Row Most Recent Value   Blood Pressure 126/81 filed at 10/14/2024 0726   Patient Position - Orthostatic VS Sitting filed at 10/13/2024 2005          First Weight: Weight - Scale: 77.1 kg (170 lb) (10/11/24 1528)  Vitals:    10/14/24 0600 10/14/24 0726   Weight: 72.6 kg (160 lb)  "72.6 kg (160 lb)         Lab Results: I have reviewed the following results:CBC/BMP:   .     10/14/24  0525   WBC 10.49*   HGB 13.5   HCT 42.3      SODIUM 137   K 3.4*      CO2 25   BUN 28*   CREATININE 1.20   GLUC 91      Results from last 7 days   Lab Units 10/14/24  0525 10/13/24  0444 10/12/24  0534   WBC Thousand/uL 10.49* 11.67* 12.04*   HEMOGLOBIN g/dL 13.5 13.5 12.0   HEMATOCRIT % 42.3 41.5 37.3   PLATELETS Thousands/uL 241 225 202     Results from last 7 days   Lab Units 10/14/24  0525 10/13/24  0444 10/12/24  1812   POTASSIUM mmol/L 3.4* 3.4* 3.9   CHLORIDE mmol/L 102 103 102   CO2 mmol/L 25 26 27   BUN mg/dL 28* 22 21   CREATININE mg/dL 1.20 1.16 1.29   CALCIUM mg/dL 9.2 9.4 9.7     Results from last 7 days   Lab Units 10/11/24  1504   INR  1.22*   PTT seconds 31     Lab Results   Component Value Date    HGBA1C 5.5 10/11/2024     No results found for: \"CKTOTAL\", \"CKMB\", \"CKMBINDEX\", \"TROPONINI\"    Imaging Results Review: No pertinent imaging studies reviewed.  Other Study Results Review: EKG was reviewed.     VTE Pharmacologic Prophylaxis: VTE covered by:  enoxaparin, Subcutaneous, 40 mg at 10/14/24 0843     VTE Mechanical Prophylaxis: sequential compression device      "

## 2024-10-14 NOTE — PROGRESS NOTES
Progress Note - Hospitalist   Name: Humphrey Kiser 64 y.o. male I MRN: [id number]  Unit/Bed#: -[facility] I Date of Admission: 10/11/2024   Date of Service: 10/14/2024 I Hospital Day: 3    Assessment & Plan  Volume overload  Presented to the emergency department due to increasing shortness of breath  CTA PE study: No pulmonary embolus.  Pulmonary edema. Bilateral pleural effusions with adjacent consolidation probably passive atelectasis. Contrast refluxing into the IVC and hepatic veins suggests right heart systolic dysfunction, recommend echocardiographic correlate.  BNP 1782  No prior history of known heart failure  S/p IV Lasix x 1  Lasix to 40 mg IV 3 times daily transitioned to torsemide 20 mg qd  Initiating toprol XL 25mg   Continue on Aldactone and Cozaar  Echo reviewed  Continue life vest  Fluid restriction  Daily weight and I&O's  Trend BMP while diuresing   Discussed with cardio   F/u with cardio outpt  Tobacco abuse   cessation  Continue nicotine patch  Elevated troponin  Troponin noted to be 145, 113 and 126  Suspect in setting of volume overload, patient denies chest pain  Monitor on telemetry  Continue medical management with aspirin, statin, Cozaar  History of CVA (cerebrovascular accident)  Continue ASA and statin  HbA1c is normal  QT prolongation  EKG on admission with QTc 510  Electrolytes are normal.  Resolved  Elevated blood pressure reading  Patient with elevated blood pressures since arrival  Suspect in setting of SOB, volume overload  Improved with the regimen stated above  Congestive heart failure (CHF) (HCC)  Wt Readings from Last 3 Encounters:   10/14/24 72.6 kg (160 lb)   01/25/22 93.4 kg (206 lb)   01/07/22 94.8 kg (209 lb)               VTE Pharmacologic Prophylaxis: VTE Score: 4 Moderate Risk (Score 3-4) - Pharmacological DVT Prophylaxis Ordered: enoxaparin (Lovenox).    Mobility:   Basic Mobility Inpatient Raw Score: 24  JH-HLM Goal: 8: Walk 250 feet or more  JH-HLM Achieved:  8: Walk 250 feet ot more  JH-HLM Goal achieved. Continue to encourage appropriate mobility.    Patient Centered Rounds: I performed bedside rounds with nursing staff today.   Discussions with Specialists or Other Care Team Provider: cardio, CM    Education and Discussions with Family / Patient: Patient declined call to .     Current Length of Stay: 3 day(s)  Current Patient Status: Inpatient   Certification Statement: The patient will continue to require additional inpatient hospital stay due to achfe  Discharge Plan: Anticipate discharge tomorrow to home.    Code Status: Level 1 - Full Code    Sherly Yin was seen and examined at bedside..  Discussed plan of care.  All questions and concerns were answered and addressed.  Patient aggravated due to needing to stay another night.  Patient will require LifeVest placement.  Discussed case with cardiology.  IV Lasix transition to torsemide.  Toprol initiated.  Continue Aldactone and Cozaar    Objective :  Temp:  [97.3 °F (36.3 °C)-97.8 °F (36.6 °C)] 97.3 °F (36.3 °C)  HR:  [72-90] 90  BP: (114-161)/() 148/96  Resp:  [18] 18  SpO2:  [94 %] 94 %    Body mass index is 23.63 kg/m².     Input and Output Summary (last 24 hours):     Intake/Output Summary (Last 24 hours) at 10/14/2024 1432  Last data filed at 10/14/2024 1318  Gross per 24 hour   Intake 786 ml   Output 3150 ml   Net -2364 ml       Physical Exam  Vitals and nursing note reviewed.   Constitutional:       General: He is not in acute distress.     Appearance: He is normal weight. He is ill-appearing (chronically).   HENT:      Head: Normocephalic and atraumatic.   Cardiovascular:      Rate and Rhythm: Normal rate and regular rhythm.      Pulses: Normal pulses.      Heart sounds: Normal heart sounds.   Pulmonary:      Effort: Pulmonary effort is normal.      Breath sounds: Normal breath sounds.   Abdominal:      General: Abdomen is flat. Bowel sounds are normal.      Palpations: Abdomen  is soft.   Musculoskeletal:      Right lower leg: No edema.      Left lower leg: No edema.   Skin:     General: Skin is warm.   Neurological:      General: No focal deficit present.      Mental Status: He is alert and oriented to person, place, and time.     Lines/Drains:              Lab Results: I have reviewed the following results:   Results from last 7 days   Lab Units 10/14/24  0525   WBC Thousand/uL 10.49*   HEMOGLOBIN g/dL 13.5   HEMATOCRIT % 42.3   PLATELETS Thousands/uL 241   SEGS PCT % 63   LYMPHO PCT % 19   MONO PCT % 9   EOS PCT % 7*     Results from last 7 days   Lab Units 10/14/24  0525 10/13/24  0444   SODIUM mmol/L 137 139   POTASSIUM mmol/L 3.4* 3.4*   CHLORIDE mmol/L 102 103   CO2 mmol/L 25 26   BUN mg/dL 28* 22   CREATININE mg/dL 1.20 1.16   ANION GAP mmol/L 10 10   CALCIUM mg/dL 9.2 9.4   ALBUMIN g/dL  --  3.9   TOTAL BILIRUBIN mg/dL  --  0.66   ALK PHOS U/L  --  86   ALT U/L  --  25   AST U/L  --  16   GLUCOSE RANDOM mg/dL 91 98     Results from last 7 days   Lab Units 10/11/24  1504   INR  1.22*         Results from last 7 days   Lab Units 10/11/24  1319   HEMOGLOBIN A1C % 5.5           Recent Cultures (last 7 days):         Imaging Results Review: No pertinent imaging studies reviewed.  Other Study Results Review: No additional pertinent studies reviewed.    Last 24 Hours Medication List:     Current Facility-Administered Medications:     acetaminophen (TYLENOL) tablet 650 mg, Q6H PRN    Armodafinil 250 mg, Daily    aspirin (ECOTRIN LOW STRENGTH) EC tablet 81 mg, Daily    atorvastatin (LIPITOR) tablet 40 mg, Daily With Dinner    docusate sodium (COLACE) capsule 100 mg, BID    enoxaparin (LOVENOX) subcutaneous injection 40 mg, Daily    losartan (COZAAR) tablet 25 mg, Daily    metoprolol succinate (TOPROL-XL) 24 hr tablet 25 mg, Daily    nicotine (NICODERM CQ) 14 mg/24hr TD 24 hr patch 1 patch, Daily    polyethylene glycol (MIRALAX) packet 17 g, Daily    senna (SENOKOT) tablet 8.6 mg, BID     spironolactone (ALDACTONE) tablet 25 mg, Daily    [START ON 10/15/2024] torsemide (DEMADEX) tablet 20 mg, Daily    Administrative Statements   Today, Patient Was Seen By: Edel Dorsey MD  I have spent a total time of 56 minutes in caring for this patient on the day of the visit/encounter including Diagnostic results, Prognosis, Risks and benefits of tx options, Instructions for management, Patient and family education, Importance of tx compliance, Risk factor reductions, Impressions, Counseling / Coordination of care, Documenting in the medical record, Reviewing / ordering tests, medicine, procedures  , Obtaining or reviewing history  , and Communicating with other healthcare professionals .    **Please Note: This note may have been constructed using a voice recognition system.**

## 2024-10-15 VITALS
SYSTOLIC BLOOD PRESSURE: 144 MMHG | HEIGHT: 69 IN | BODY MASS INDEX: 23.7 KG/M2 | DIASTOLIC BLOOD PRESSURE: 94 MMHG | WEIGHT: 160 LBS | OXYGEN SATURATION: 94 % | TEMPERATURE: 97.9 F | HEART RATE: 85 BPM | RESPIRATION RATE: 17 BRPM

## 2024-10-15 DIAGNOSIS — I42.9 CARDIOMYOPATHY (HCC): Primary | ICD-10-CM

## 2024-10-15 LAB
ANION GAP SERPL CALCULATED.3IONS-SCNC: 9 MMOL/L (ref 4–13)
BUN SERPL-MCNC: 32 MG/DL (ref 5–25)
CALCIUM SERPL-MCNC: 9.9 MG/DL (ref 8.4–10.2)
CHLORIDE SERPL-SCNC: 100 MMOL/L (ref 96–108)
CO2 SERPL-SCNC: 29 MMOL/L (ref 21–32)
CREAT SERPL-MCNC: 1.35 MG/DL (ref 0.6–1.3)
ERYTHROCYTE [DISTWIDTH] IN BLOOD BY AUTOMATED COUNT: 14.1 % (ref 11.6–15.1)
GFR SERPL CREATININE-BSD FRML MDRD: 55 ML/MIN/1.73SQ M
GLUCOSE SERPL-MCNC: 105 MG/DL (ref 65–140)
HCT VFR BLD AUTO: 48.4 % (ref 36.5–49.3)
HGB BLD-MCNC: 15.3 G/DL (ref 12–17)
MAGNESIUM SERPL-MCNC: 2.4 MG/DL (ref 1.9–2.7)
MCH RBC QN AUTO: 28.8 PG (ref 26.8–34.3)
MCHC RBC AUTO-ENTMCNC: 31.6 G/DL (ref 31.4–37.4)
MCV RBC AUTO: 91 FL (ref 82–98)
PLATELET # BLD AUTO: 274 THOUSANDS/UL (ref 149–390)
PMV BLD AUTO: 10.4 FL (ref 8.9–12.7)
POTASSIUM SERPL-SCNC: 4.2 MMOL/L (ref 3.5–5.3)
RBC # BLD AUTO: 5.31 MILLION/UL (ref 3.88–5.62)
SODIUM SERPL-SCNC: 138 MMOL/L (ref 135–147)
WBC # BLD AUTO: 10.68 THOUSAND/UL (ref 4.31–10.16)

## 2024-10-15 PROCEDURE — 99232 SBSQ HOSP IP/OBS MODERATE 35: CPT | Performed by: INTERNAL MEDICINE

## 2024-10-15 PROCEDURE — 85027 COMPLETE CBC AUTOMATED: CPT | Performed by: STUDENT IN AN ORGANIZED HEALTH CARE EDUCATION/TRAINING PROGRAM

## 2024-10-15 PROCEDURE — 83735 ASSAY OF MAGNESIUM: CPT | Performed by: STUDENT IN AN ORGANIZED HEALTH CARE EDUCATION/TRAINING PROGRAM

## 2024-10-15 PROCEDURE — 80048 BASIC METABOLIC PNL TOTAL CA: CPT | Performed by: STUDENT IN AN ORGANIZED HEALTH CARE EDUCATION/TRAINING PROGRAM

## 2024-10-15 PROCEDURE — 99239 HOSP IP/OBS DSCHRG MGMT >30: CPT | Performed by: STUDENT IN AN ORGANIZED HEALTH CARE EDUCATION/TRAINING PROGRAM

## 2024-10-15 RX ORDER — METOPROLOL SUCCINATE 25 MG/1
25 TABLET, EXTENDED RELEASE ORAL ONCE
Status: COMPLETED | OUTPATIENT
Start: 2024-10-15 | End: 2024-10-15

## 2024-10-15 RX ORDER — LISINOPRIL 5 MG/1
5 TABLET ORAL DAILY
Status: DISCONTINUED | OUTPATIENT
Start: 2024-10-16 | End: 2024-10-15 | Stop reason: HOSPADM

## 2024-10-15 RX ORDER — SPIRONOLACTONE 25 MG/1
25 TABLET ORAL DAILY
Qty: 30 TABLET | Refills: 0 | Status: SHIPPED | OUTPATIENT
Start: 2024-10-16 | End: 2024-11-15

## 2024-10-15 RX ORDER — ATORVASTATIN CALCIUM 40 MG/1
40 TABLET, FILM COATED ORAL
Qty: 30 TABLET | Refills: 0 | Status: SHIPPED | OUTPATIENT
Start: 2024-10-15 | End: 2024-11-14

## 2024-10-15 RX ORDER — METOPROLOL SUCCINATE 50 MG/1
50 TABLET, EXTENDED RELEASE ORAL DAILY
Status: DISCONTINUED | OUTPATIENT
Start: 2024-10-16 | End: 2024-10-15 | Stop reason: HOSPADM

## 2024-10-15 RX ORDER — TORSEMIDE 20 MG/1
20 TABLET ORAL DAILY
Status: DISCONTINUED | OUTPATIENT
Start: 2024-10-15 | End: 2024-10-15

## 2024-10-15 RX ORDER — METOPROLOL SUCCINATE 50 MG/1
50 TABLET, EXTENDED RELEASE ORAL DAILY
Qty: 30 TABLET | Refills: 0 | Status: SHIPPED | OUTPATIENT
Start: 2024-10-16 | End: 2024-11-15

## 2024-10-15 RX ORDER — LISINOPRIL 5 MG/1
5 TABLET ORAL DAILY
Qty: 30 TABLET | Refills: 0 | Status: SHIPPED | OUTPATIENT
Start: 2024-10-16 | End: 2024-11-15

## 2024-10-15 RX ADMIN — METOPROLOL SUCCINATE 25 MG: 25 TABLET, EXTENDED RELEASE ORAL at 12:50

## 2024-10-15 RX ADMIN — SENNOSIDES 8.6 MG: 8.6 TABLET, FILM COATED ORAL at 17:34

## 2024-10-15 RX ADMIN — POLYETHYLENE GLYCOL 3350 17 G: 17 POWDER, FOR SOLUTION ORAL at 09:26

## 2024-10-15 RX ADMIN — LOSARTAN POTASSIUM 25 MG: 25 TABLET, FILM COATED ORAL at 09:26

## 2024-10-15 RX ADMIN — METOPROLOL SUCCINATE 25 MG: 25 TABLET, EXTENDED RELEASE ORAL at 09:26

## 2024-10-15 RX ADMIN — NICOTINE 1 PATCH: 14 PATCH, EXTENDED RELEASE TRANSDERMAL at 09:25

## 2024-10-15 RX ADMIN — ENOXAPARIN SODIUM 40 MG: 100 INJECTION SUBCUTANEOUS at 09:26

## 2024-10-15 RX ADMIN — DOCUSATE SODIUM 100 MG: 100 CAPSULE, LIQUID FILLED ORAL at 17:34

## 2024-10-15 RX ADMIN — ASPIRIN 81 MG: 81 TABLET, COATED ORAL at 09:26

## 2024-10-15 RX ADMIN — DOCUSATE SODIUM 100 MG: 100 CAPSULE, LIQUID FILLED ORAL at 09:29

## 2024-10-15 RX ADMIN — ATORVASTATIN CALCIUM 40 MG: 40 TABLET, FILM COATED ORAL at 17:34

## 2024-10-15 RX ADMIN — SENNOSIDES 8.6 MG: 8.6 TABLET, FILM COATED ORAL at 09:26

## 2024-10-15 RX ADMIN — SPIRONOLACTONE 25 MG: 25 TABLET ORAL at 09:26

## 2024-10-15 NOTE — PROGRESS NOTES
Progress Note - Cardiology   Name: Humphrey Kiser 64 y.o. male I MRN: [id number]  Unit/Bed#: -01 I Date of Admission: 10/11/2024   Date of Service: 10/15/2024 I Hospital Day: 4    Assessment & Plan  Congestive heart failure (CHF) (HCC)  New diagnosis for patient   Echo: Patient with dilated CMP, EF 20%, grade II diastolic dysfunction, mild AS, trace MR, Mild TR.  Patient resistant in the past to taking medications, Spoke with patient at length about current hospital medication regime.  Patient willing to take medications outpatient, however has financial concerns about affordability - care management following.     Currently on losartan, spironolactone, metoprolol    Examines euvolemic, creatinine  1.3 today - torsemide discontinued     Plan:  Outpatient ischemic workup  Life Vest ordered   Continue spironolactone,   Increase metoprolol to 50 mg  DC Cozaar - start lisinopril 5 mg daily   DC torsemide   Tobacco abuse  Patient reports a mult year history of smoking tobacco. Has attempted many times to quit. Not interested in quitting at this time.   Plan:  Continue with nicotine patches while in hospital   Elevated troponin  On admission troponin 145, trended down  Patient denies chest pain    Elevated blood pressure reading  Patients -140/70-90's after starting Toprol     Plan:  Increase Toprol XL 50 mg daily    Subjective   Chief Complaint: no complaints at this time   Patient denies shortness of breath/chest pain this morning.     Objective :  Temp:  [97.7 °F (36.5 °C)-97.9 °F (36.6 °C)] 97.9 °F (36.6 °C)  HR:  [76-90] 82  BP: (114-165)/() 145/92  Resp:  [17-20] 17  SpO2:  [76 %-98 %] 76 %  O2 Device: None (Room air)  Orthostatic Blood Pressures      Flowsheet Row Most Recent Value   Blood Pressure 145/92 filed at 10/15/2024 0809   Patient Position - Orthostatic VS Sitting filed at 10/14/2024 2231          First Weight: Weight - Scale: 77.1 kg (170 lb) (10/11/24 1528)  Vitals:    10/14/24 0726  "10/15/24 0557   Weight: 72.6 kg (160 lb) 72.6 kg (160 lb)     Physical Exam  HENT:      Head: Normocephalic and atraumatic.      Mouth/Throat:      Mouth: Mucous membranes are moist.   Cardiovascular:      Rate and Rhythm: Normal rate and regular rhythm.      Pulses: Normal pulses.   Pulmonary:      Effort: Pulmonary effort is normal.      Breath sounds: Normal breath sounds.   Abdominal:      General: Abdomen is flat. Bowel sounds are normal.      Palpations: Abdomen is soft.   Musculoskeletal:         General: No swelling. Normal range of motion.      Cervical back: Normal range of motion.      Right lower leg: No edema.      Left lower leg: No edema.   Skin:     General: Skin is cool and dry.      Capillary Refill: Capillary refill takes less than 2 seconds.   Neurological:      General: No focal deficit present.      Mental Status: He is alert and oriented to person, place, and time.         Lab Results: I have reviewed the following results:CBC/BMP:   .     10/15/24  0549   WBC 10.68*   HGB 15.3   HCT 48.4      SODIUM 138   K 4.2      CO2 29   BUN 32*   CREATININE 1.35*   GLUC 105   MG 2.4      Results from last 7 days   Lab Units 10/15/24  0549 10/14/24  0525 10/13/24  0444   WBC Thousand/uL 10.68* 10.49* 11.67*   HEMOGLOBIN g/dL 15.3 13.5 13.5   HEMATOCRIT % 48.4 42.3 41.5   PLATELETS Thousands/uL 274 241 225     Results from last 7 days   Lab Units 10/15/24  0549 10/14/24  0525 10/13/24  0444   POTASSIUM mmol/L 4.2 3.4* 3.4*   CHLORIDE mmol/L 100 102 103   CO2 mmol/L 29 25 26   BUN mg/dL 32* 28* 22   CREATININE mg/dL 1.35* 1.20 1.16   CALCIUM mg/dL 9.9 9.2 9.4     Results from last 7 days   Lab Units 10/11/24  1504   INR  1.22*   PTT seconds 31     Lab Results   Component Value Date    HGBA1C 5.5 10/11/2024     No results found for: \"CKTOTAL\", \"CKMB\", \"CKMBINDEX\", \"TROPONINI\"    Imaging Results Review: No pertinent imaging studies reviewed.  Other Study Results Review: No additional pertinent " studies reviewed.    VTE Pharmacologic Prophylaxis: VTE covered by:  enoxaparin, Subcutaneous, 40 mg at 10/14/24 0893     VTE Mechanical Prophylaxis: sequential compression device

## 2024-10-15 NOTE — PLAN OF CARE
Problem: PAIN - ADULT  Goal: Verbalizes/displays adequate comfort level or baseline comfort level  Description: Interventions:  - Encourage patient to monitor pain and request assistance  - Assess pain using appropriate pain scale  - Administer analgesics based on type and severity of pain and evaluate response  - Implement non-pharmacological measures as appropriate and evaluate response  - Consider cultural and social influences on pain and pain management  - Notify physician/advanced practitioner if interventions unsuccessful or patient reports new pain  10/15/2024 0815 by Myrna Menard RN  Outcome: Progressing  10/15/2024 0815 by Myrna Menard RN  Outcome: Progressing     Problem: SAFETY ADULT  Goal: Maintain or return to baseline ADL function  Description: INTERVENTIONS:  -  Assess patient's ability to carry out ADLs; assess patient's baseline for ADL function and identify physical deficits which impact ability to perform ADLs (bathing, care of mouth/teeth, toileting, grooming, dressing, etc.)  - Assess/evaluate cause of self-care deficits   - Assess range of motion  - Assess patient's mobility; develop plan if impaired  - Assess patient's need for assistive devices and provide as appropriate  - Encourage maximum independence but intervene and supervise when necessary  - Involve family in performance of ADLs  - Assess for home care needs following discharge   - Consider OT consult to assist with ADL evaluation and planning for discharge  - Provide patient education as appropriate  10/15/2024 0815 by Myrna Menard RN  Outcome: Progressing  10/15/2024 0815 by Myrna Menard RN  Outcome: Progressing

## 2024-10-15 NOTE — PLAN OF CARE
Problem: PAIN - ADULT  Goal: Verbalizes/displays adequate comfort level or baseline comfort level  Description: Interventions:  - Encourage patient to monitor pain and request assistance  - Assess pain using appropriate pain scale  - Administer analgesics based on type and severity of pain and evaluate response  - Implement non-pharmacological measures as appropriate and evaluate response  - Consider cultural and social influences on pain and pain management  - Notify physician/advanced practitioner if interventions unsuccessful or patient reports new pain  Outcome: Progressing     Problem: INFECTION - ADULT  Goal: Absence or prevention of progression during hospitalization  Description: INTERVENTIONS:  - Assess and monitor for signs and symptoms of infection  - Monitor lab/diagnostic results  - Monitor all insertion sites, i.e. indwelling lines, tubes, and drains  - Monitor endotracheal if appropriate and nasal secretions for changes in amount and color  - [place] appropriate cooling/warming therapies per order  - Administer medications as ordered  - Instruct and encourage patient and family to use good hand hygiene technique  - Identify and instruct in appropriate isolation precautions for identified infection/condition  Outcome: Progressing  Goal: Absence of fever/infection during neutropenic period  Description: INTERVENTIONS:  - Monitor WBC    Outcome: Progressing     Problem: SAFETY ADULT  Goal: Patient will remain free of falls  Description: INTERVENTIONS:  - Educate patient/family on patient safety including physical limitations  - Instruct patient to call for assistance with activity   - Consult OT/PT to assist with strengthening/mobility   - Keep Call bell within reach  - Keep bed low and locked with side rails adjusted as appropriate  - Keep care items and personal belongings within reach  - Initiate and maintain comfort rounds  - Make Fall Risk Sign visible to staff  - Offer Toileting every 2 Hours,  in advance of need  - Initiate/Maintain shift alarm  - Obtain necessary fall risk management equipment: socks   - Apply yellow socks and bracelet for high fall risk patients  - Consider moving patient to room near nurses station  Outcome: Progressing  Goal: Maintain or return to baseline ADL function  Description: INTERVENTIONS:  -  Assess patient's ability to carry out ADLs; assess patient's baseline for ADL function and identify physical deficits which impact ability to perform ADLs (bathing, care of mouth/teeth, toileting, grooming, dressing, etc.)  - Assess/evaluate cause of self-care deficits   - Assess range of motion  - Assess patient's mobility; develop plan if impaired  - Assess patient's need for assistive devices and provide as appropriate  - Encourage maximum independence but intervene and supervise when necessary  - Involve family in performance of ADLs  - Assess for home care needs following discharge   - Consider OT consult to assist with ADL evaluation and planning for discharge  - Provide patient education as appropriate  Outcome: Progressing  Goal: Maintains/Returns to pre admission functional level  Description: INTERVENTIONS:  - Perform AM-PAC 6 Click Basic Mobility/ Daily Activity assessment daily.  - Set and communicate daily mobility goal to care team and patient/family/caregiver.   - Collaborate with rehabilitation services on mobility goals if consulted  - Perform Range of Motion 2 times a day.  - Reposition patient every 2 hours.  - Dangle patient 2 times a day  - Stand patient 2 times a day  - Ambulate patient 2 times a day  - Out of bed to chair 2 times a day   - Out of bed for meals 2 times a day  - Out of bed for toileting  - Record patient progress and toleration of activity level   Outcome: Progressing     Problem: DISCHARGE PLANNING  Goal: Discharge to home or other facility with appropriate resources  Description: INTERVENTIONS:  - Identify barriers to discharge w/patient and  caregiver  - Arrange for needed discharge resources and transportation as appropriate  - Identify discharge learning needs (meds, wound care, etc.)  - Arrange for interpretive services to assist at discharge as needed  - Refer to Case Management Department for coordinating discharge planning if the patient needs post-hospital services based on physician/advanced practitioner order or complex needs related to functional status, cognitive ability, or social support system  Outcome: Progressing     Problem: Knowledge Deficit  Goal: Patient/family/caregiver demonstrates understanding of disease process, treatment plan, medications, and discharge instructions  Description: Complete learning assessment and assess knowledge base.  Interventions:  - Provide teaching at level of understanding  - Provide teaching via preferred learning methods  Outcome: Progressing     Problem: CARDIOVASCULAR - ADULT  Goal: Maintains optimal cardiac output and hemodynamic stability  Description: INTERVENTIONS:  - Monitor I/O, vital signs and rhythm  - Monitor for S/S and trends of decreased cardiac output  - Administer and titrate ordered vasoactive medications to optimize hemodynamic stability  - Assess quality of pulses, skin color and temperature  - Assess for signs of decreased coronary artery perfusion  - Instruct patient to report change in severity of symptoms  Outcome: Progressing  Goal: Absence of cardiac dysrhythmias or at baseline rhythm  Description: INTERVENTIONS:  - Continuous cardiac monitoring, vital signs, obtain 12 lead EKG if ordered  - Administer antiarrhythmic and heart rate control medications as ordered  - Monitor electrolytes and administer replacement therapy as ordered  Outcome: Progressing     Problem: RESPIRATORY - ADULT  Goal: Achieves optimal ventilation and oxygenation  Description: INTERVENTIONS:  - Assess for changes in respiratory status  - Assess for changes in mentation and behavior  - Position to  facilitate oxygenation and minimize respiratory effort  - Oxygen administered by appropriate delivery if ordered  - Initiate smoking cessation education as indicated  - Encourage broncho-pulmonary hygiene including cough, deep breathe, Incentive Spirometry  - Assess the need for suctioning and aspirate as needed  - Assess and instruct to report SOB or any respiratory difficulty  - Respiratory Therapy support as indicated  Outcome: Progressing

## 2024-10-15 NOTE — DISCHARGE INSTR - AVS FIRST PAGE
You are to follow-up with your PCP    You are to follow-up with cardiology    You will be discharged with the following regimen:  Torsemide 20 mg to be taken daily  Metoprolol XL 50 mg to be taken once a day  Lisinopril 5 mg to be taken once a day  Aldactone 25 mg to be taken once a day  Lipitor 40 mg to be taken daily with dinner  Aspirin 81 mg to be taken daily

## 2024-10-15 NOTE — ASSESSMENT & PLAN NOTE
New diagnosis for patient   Echo: Patient with dilated CMP, EF 20%, grade II diastolic dysfunction, mild AS, trace MR, Mild TR.  Patient resistant in the past to taking medications, Spoke with patient at length about current hospital medication regime.  Patient willing to take medications outpatient, however has financial concerns about affordability - care management following.     Currently on losartan, spironolactone, metoprolol    Examines euvolemic, creatinine  1.3 today - torsemide discontinued     Plan:  Outpatient ischemic workup  Life Vest ordered   Continue spironolactone,   Increase metoprolol to 50 mg  DC Cozaar - start lisinopril 5 mg daily   DC torsemide

## 2024-10-16 NOTE — UTILIZATION REVIEW
NOTIFICATION OF ADMISSION DISCHARGE   This is a Notification of Discharge from [facility]. Please be advised that this patient has been discharge from our facility. Below you will find the admission and discharge date and time including the patient’s disposition.   UTILIZATION REVIEW CONTACT:  Yelena Taylor  Utilization   Network Utilization Review Department  Phone: [phone] x carefully listen to the prompts. All voicemails are confidential.  Email: [email or web address]     ADMISSION INFORMATION  PRESENTATION DATE: 10/11/2024 12:52 PM  OBERVATION ADMISSION DATE: N/A  INPATIENT ADMISSION DATE: 10/11/24  3:25 PM   DISCHARGE DATE: 10/15/2024  6:24 PM   DISPOSITION:Home/Self Care    Network Utilization Review Department  ATTENTION: Please call with any questions or concerns to [phone] and carefully listen to the prompts so that you are directed to the right person. All voicemails are confidential.   For Discharge needs, contact Care Management DC Support Team at [phone] opt. [phone]  Send all requests for admission clinical reviews, approved or denied determinations and any other requests to dedicated fax number below belonging to the campus where the patient is receiving treatment. List of dedicated fax numbers for the Facilities:  FACILITY NAME UR FAX NUMBER   ADMISSION DENIALS (Administrative/Medical Necessity) [phone]   DISCHARGE SUPPORT TEAM ([facility]) [phone]   PARENT CHILD HEALTH (Maternity/NICU/Pediatrics) [phone]   [facility] [phone]   [facility] [phone]   [facility] [phone]   [facility] [phone]   [facility] [phone]   [facility] [phone]   [facility] [phone]   [facility]  [phone]   [facility] [phone]   [facility] [phone]   [facility] [phone]   [facility] [phone]

## 2024-10-29 NOTE — PROGRESS NOTES
"Cardiology Office Follow Up  Humphrey Kiser  1960  [id number]      ASSESSMENT:  HFrEF/unspecified cardiomyopathy  10/14/2024 echo: EF 20%, mild aortic stenosis  Tobacco use    PLAN/ DISCUSSION:  HFrEF/unspecified cardiomyopathy  He appears well compensated today  Due to severe fatigue we will decrease metoprolol from 50 down to 25  Due to elevated blood pressure we will increase lisinopril from 5 up to 10  Continue spironolactone 25 daily  Check NST for ischemic substrate  If NST is negative then we will pursue cardiac MRI  Continue empiric aspirin and statin  Patient continues to decline LifeVest. He actually has very limited insight into his current medical condition. He is accompanied by caretaker today. We will see him back in 6 weeks for close follow-up.    Interval History/ HPI:   This is a 64-year-old gentleman coming in for posthospital follow-up visit at which time he was found to have a reduced ejection fraction of 20% and was plan for further outpatient workup.    He comes to the office today feeling fatigued.  He feels as if he has no energy.  He is unsure the reason for all of the medications that he is on.  He states that his breathing is stable.  He seems to have limited insight into his medical condition.     Vitals:  /78 (BP Location: Left arm, Patient Position: Sitting, Cuff Size: Standard)   Pulse 86   Ht 5' 9\" (1.753 m)   Wt 75.3 kg (166 lb)   BMI 24.51 kg/m²      Past Medical History:   Diagnosis Date    Colon polyp     Hyperlipidemia     Stroke (HCC)     left sided numbness residual     Social History     Socioeconomic History    Marital status: Single     Spouse name: Not on file    Number of children: Not on file    Years of education: Not on file    Highest education level: Not on file   Occupational History    Not on file   Tobacco Use    Smoking status: Every Day     Current packs/day: 0.00     Types: Pipe, Cigarettes     Last attempt to quit: 10/29/2005     Years since " quittin.0    Smokeless tobacco: Never    Tobacco comments:     patient is currently using pipe   Vaping Use    Vaping status: Never Used   Substance and Sexual Activity    Alcohol use: Yes     Alcohol/week: 14.0 standard drinks of alcohol     Types: 14 Glasses of wine per week    Drug use: Yes     Frequency: 1.0 times per week     Types: Marijuana    Sexual activity: Yes     Partners: Female   Other Topics Concern    Not on file   Social History Narrative    Not on file     Social Determinants of Health     Financial Resource Strain: Not on file   Food Insecurity: No Food Insecurity (10/14/2024)    Nursing - Inadequate Food Risk Classification     Worried About Running Out of Food in the Last Year: Never true     Ran Out of Food in the Last Year: Never true     Ran Out of Food in the Last Year: Not on file   Transportation Needs: No Transportation Needs (10/14/2024)    PRAPARE - Transportation     Lack of Transportation (Medical): No     Lack of Transportation (Non-Medical): No   Physical Activity: Not on file   Stress: Not on file   Social Connections: Not on file   Intimate Partner Violence: Not on file   Housing Stability: Low Risk  (10/14/2024)    Housing Stability Vital Sign     Unable to Pay for Housing in the Last Year: No     Number of Times Moved in the Last Year: 0     Homeless in the Last Year: No      Family History   Problem Relation Age of Onset    Arthritis Mother     Alcohol abuse Father     Mental illness Father     Alcohol abuse Sister     Alcohol abuse Brother      Past Surgical History:   Procedure Laterality Date    COLONOSCOPY      HERNIA REPAIR Left     inguinal       Current Outpatient Medications:     Armodafinil 250 MG tablet, Take 250 mg by mouth as needed, Disp: , Rfl:     aspirin (ECOTRIN LOW STRENGTH) 81 mg EC tablet, Take 1 tablet (81 mg total) by mouth daily, Disp: 30 tablet, Rfl: 0    atorvastatin (LIPITOR) 40 mg tablet, Take 1 tablet (40 mg total) by mouth daily with dinner,  Disp: 30 tablet, Rfl: 0    lisinopril (ZESTRIL) 5 mg tablet, Take 1 tablet (5 mg total) by mouth daily Do not start before October 16, 2024., Disp: 30 tablet, Rfl: 0    metoprolol succinate (TOPROL-XL) 50 mg 24 hr tablet, Take 1 tablet (50 mg total) by mouth daily Do not start before October 16, 2024., Disp: 30 tablet, Rfl: 0    spironolactone (ALDACTONE) 25 mg tablet, Take 1 tablet (25 mg total) by mouth daily, Disp: 30 tablet, Rfl: 0      Review of Systems:  Review of Systems   Constitutional:  Positive for fatigue. Negative for chills and fever.   HENT:  Negative for ear pain and sore throat.    Eyes:  Negative for pain and visual disturbance.   Respiratory:  Negative for cough and shortness of breath.    Cardiovascular:  Negative for chest pain and palpitations.   Gastrointestinal:  Negative for abdominal pain and vomiting.   Genitourinary:  Negative for dysuria and hematuria.   Musculoskeletal:  Negative for arthralgias and back pain.   Skin:  Negative for color change and rash.   Neurological:  Negative for seizures and syncope.   All other systems reviewed and are negative.        Physical Exam:  Physical Exam  Constitutional:       Appearance: He is well-developed.   HENT:      Head: Normocephalic and atraumatic.   Eyes:      Pupils: Pupils are equal, round, and reactive to light.   Cardiovascular:      Rate and Rhythm: Normal rate and regular rhythm.      Heart sounds: No murmur heard.     No friction rub. No gallop.   Pulmonary:      Effort: Pulmonary effort is normal. No respiratory distress.      Breath sounds: Normal breath sounds. No wheezing or rales.   Abdominal:      General: Bowel sounds are normal. There is no distension.      Palpations: Abdomen is soft.      Tenderness: There is no abdominal tenderness.   Musculoskeletal:         General: No deformity. Normal range of motion.      Cervical back: Normal range of motion and neck supple.   Skin:     General: Skin is warm and dry.   Neurological:       Mental Status: He is alert and oriented to person, place, and time.   Psychiatric:         Behavior: Behavior normal.         This note was completed in part utilizing [employer] Direct Software.  Grammatical errors, random word insertions, spelling mistakes, and incomplete sentences can be an occasional consequence of this system secondary to software limitations, ambient noise, and hardware issues.  If you have any questions or concerns about the content, text, or information contained within the body of this dictation, please contact the provider for clarification.

## 2024-10-30 ENCOUNTER — OFFICE VISIT (OUTPATIENT)
Dept: CARDIOLOGY CLINIC | Facility: CLINIC | Age: 64
End: 2024-10-30
Payer: COMMERCIAL

## 2024-10-30 VITALS
WEIGHT: 166 LBS | DIASTOLIC BLOOD PRESSURE: 78 MMHG | BODY MASS INDEX: 24.59 KG/M2 | HEIGHT: 69 IN | HEART RATE: 86 BPM | SYSTOLIC BLOOD PRESSURE: 146 MMHG

## 2024-10-30 DIAGNOSIS — I50.9 CHF (CONGESTIVE HEART FAILURE) (HCC): ICD-10-CM

## 2024-10-30 DIAGNOSIS — I50.22 CHRONIC SYSTOLIC CONGESTIVE HEART FAILURE (HCC): Primary | ICD-10-CM

## 2024-10-30 PROCEDURE — 99214 OFFICE O/P EST MOD 30 MIN: CPT | Performed by: PHYSICIAN ASSISTANT

## 2024-10-30 RX ORDER — ASPIRIN 81 MG/1
81 TABLET ORAL DAILY
Start: 2024-10-30

## 2024-10-30 RX ORDER — LISINOPRIL 10 MG/1
10 TABLET ORAL DAILY
Qty: 90 TABLET | Refills: 1 | Status: CANCELLED | OUTPATIENT
Start: 2024-10-30

## 2024-10-30 RX ORDER — SPIRONOLACTONE 25 MG/1
25 TABLET ORAL DAILY
Qty: 90 TABLET | Refills: 1 | Status: CANCELLED | OUTPATIENT
Start: 2024-10-30

## 2024-10-30 RX ORDER — SPIRONOLACTONE 25 MG/1
25 TABLET ORAL DAILY
Qty: 90 TABLET | Refills: 1 | Status: SHIPPED | OUTPATIENT
Start: 2024-10-30

## 2024-10-30 RX ORDER — ATORVASTATIN CALCIUM 40 MG/1
40 TABLET, FILM COATED ORAL
Qty: 90 TABLET | Refills: 1 | Status: SHIPPED | OUTPATIENT
Start: 2024-10-30

## 2024-10-30 RX ORDER — LISINOPRIL 10 MG/1
10 TABLET ORAL DAILY
Qty: 90 TABLET | Refills: 1 | Status: SHIPPED | OUTPATIENT
Start: 2024-10-30

## 2024-10-30 RX ORDER — METOPROLOL SUCCINATE 25 MG/1
25 TABLET, EXTENDED RELEASE ORAL DAILY
Qty: 90 TABLET | Refills: 1 | Status: SHIPPED | OUTPATIENT
Start: 2024-10-30

## 2024-10-30 RX ORDER — METOPROLOL SUCCINATE 50 MG/1
25 TABLET, EXTENDED RELEASE ORAL DAILY
Qty: 90 TABLET | Refills: 1 | Status: CANCELLED | OUTPATIENT
Start: 2024-10-30

## 2024-10-30 NOTE — PATIENT INSTRUCTIONS
Please decrease metoprolol down to 25 mg once daily and increase lisinopril up to 10 mg daily  Please repeat blood work in about 2 weeks after making this medication change  Please schedule your stress test. This is vital to make sure there are no blockages in the arteries around the heart.   Follow up in 6 weeks

## 2024-11-04 ENCOUNTER — HOSPITAL ENCOUNTER (OUTPATIENT)
Dept: NON INVASIVE DIAGNOSTICS | Facility: CLINIC | Age: 64
Discharge: HOME | End: 2024-11-04
Payer: COMMERCIAL

## 2024-11-04 VITALS — DIASTOLIC BLOOD PRESSURE: 74 MMHG | HEART RATE: 66 BPM | OXYGEN SATURATION: 99 % | SYSTOLIC BLOOD PRESSURE: 122 MMHG

## 2024-11-04 DIAGNOSIS — I50.22 CHRONIC SYSTOLIC CONGESTIVE HEART FAILURE (HCC): ICD-10-CM

## 2024-11-04 DIAGNOSIS — I50.9 CHF (CONGESTIVE HEART FAILURE) (HCC): ICD-10-CM

## 2024-11-04 LAB
NUC STRESS EJECTION FRACTION: 29 %
RATE PRESSURE PRODUCT: NORMAL
SL CV REST NUCLEAR ISOTOPE DOSE: 7.73 MCI
SL CV STRESS NUCLEAR ISOTOPE DOSE: 24.1 MCI
SL CV STRESS RECOVERY BP: NORMAL MMHG
SL CV STRESS RECOVERY HR: 79 BPM
SL CV STRESS RECOVERY O2 SAT: 100 %
STRESS ANGINA INDEX: 0
STRESS BASELINE BP: NORMAL MMHG
STRESS BASELINE HR: 66 BPM
STRESS O2 SAT REST: 99 %
STRESS PEAK HR: 88 BPM
STRESS POST O2 SAT PEAK: 99 %
STRESS POST PEAK BP: 118 MMHG
STRESS/REST PERFUSION RATIO: 1.04

## 2024-11-04 PROCEDURE — 93018 CV STRESS TEST I&R ONLY: CPT | Performed by: INTERNAL MEDICINE

## 2024-11-04 PROCEDURE — 93016 CV STRESS TEST SUPVJ ONLY: CPT | Performed by: INTERNAL MEDICINE

## 2024-11-04 PROCEDURE — A9502 TC99M TETROFOSMIN: HCPCS

## 2024-11-04 PROCEDURE — 93017 CV STRESS TEST TRACING ONLY: CPT

## 2024-11-04 PROCEDURE — 78452 HT MUSCLE IMAGE SPECT MULT: CPT

## 2024-11-04 PROCEDURE — 78452 HT MUSCLE IMAGE SPECT MULT: CPT | Performed by: INTERNAL MEDICINE

## 2024-11-04 RX ORDER — REGADENOSON 0.08 MG/ML
0.4 INJECTION, SOLUTION INTRAVENOUS ONCE
Status: COMPLETED | OUTPATIENT
Start: 2024-11-04 | End: 2024-11-04

## 2024-11-04 RX ADMIN — REGADENOSON 0.4 MG: 0.08 INJECTION, SOLUTION INTRAVENOUS at 12:51

## 2024-11-05 ENCOUNTER — DOCUMENTATION (OUTPATIENT)
Dept: NON INVASIVE DIAGNOSTICS | Facility: HOSPITAL | Age: 64
End: 2024-11-05

## 2024-11-05 DIAGNOSIS — I50.22 CHRONIC SYSTOLIC CONGESTIVE HEART FAILURE (HCC): Primary | ICD-10-CM

## 2024-11-05 LAB
CHEST PAIN STATEMENT: NORMAL
MAX DIASTOLIC BP: 80 MMHG
MAX PREDICTED HEART RATE: 157 BPM
PROTOCOL NAME: NORMAL
REASON FOR TERMINATION: NORMAL
STRESS POST EXERCISE DUR MIN: 3 MIN
STRESS POST EXERCISE DUR SEC: 0 SEC
STRESS POST PEAK HR: 88 BPM
STRESS POST PEAK SYSTOLIC BP: 124 MMHG
TARGET HR FORMULA: NORMAL
TEST INDICATION: NORMAL

## 2024-11-05 NOTE — PROGRESS NOTES
Patient has an abnormal stress test suggestive of reversible inferior ischemia.  I called him today and spoke with him at length on the phone.  He currently has no symptoms concerning for angina.  I recommended in the situation he should undergo heart catheterization for definitive evaluation of his coronary anatomy.  We also discussed the alternative of a cardiac CTA.  At this time he is agreeable for cardiac CTA but prefers to not go through catheterization unless absolutely necessary.  He does have difficulty with scheduling him with transportation so staying locally in [place] will be much more reasonable for him.    We will order cardiac CTA.  I called his contact Jeremiah who arranges his appointments as well as transportation and explained all of the above.  I asked the patient to double his metoprolol the night before his test in the morning of the test for goal heart rate < 70.  Will reach out to our scheduling department as well.

## 2024-11-07 ENCOUNTER — TELEPHONE (OUTPATIENT)
Dept: CARDIOLOGY CLINIC | Facility: CLINIC | Age: 64
End: 2024-11-07

## 2024-11-07 ENCOUNTER — TELEPHONE (OUTPATIENT)
Dept: INTERVENTIONAL RADIOLOGY/VASCULAR | Facility: HOSPITAL | Age: 64
End: 2024-11-07

## 2024-11-07 NOTE — TELEPHONE ENCOUNTER
----- Message from Lupillo House PA-C sent at 11/7/2024  2:02 PM EST -----  Regarding: Cardiac CTA instructions  Hello if you get any free time could you please let this patient know that he should take 50 mg metoprolol the evening prior to his CTA and 50 mg on the morning of this CTA? I'm sorry I would usually call but we are swamped at the hospital.     Thanks so much  Shane

## 2024-11-14 ENCOUNTER — RESULTS FOLLOW-UP (OUTPATIENT)
Dept: NON INVASIVE DIAGNOSTICS | Facility: HOSPITAL | Age: 64
End: 2024-11-14

## 2024-11-14 ENCOUNTER — HOSPITAL ENCOUNTER (OUTPATIENT)
Dept: CT IMAGING | Facility: HOSPITAL | Age: 64
Discharge: HOME | End: 2024-11-14
Payer: COMMERCIAL

## 2024-11-14 ENCOUNTER — TELEPHONE (OUTPATIENT)
Dept: CARDIOLOGY CLINIC | Facility: CLINIC | Age: 64
End: 2024-11-14

## 2024-11-14 ENCOUNTER — PREP FOR PROCEDURE (OUTPATIENT)
Dept: NON INVASIVE DIAGNOSTICS | Facility: HOSPITAL | Age: 64
End: 2024-11-14

## 2024-11-14 ENCOUNTER — DOCUMENTATION (OUTPATIENT)
Dept: NON INVASIVE DIAGNOSTICS | Facility: HOSPITAL | Age: 64
End: 2024-11-14

## 2024-11-14 VITALS — SYSTOLIC BLOOD PRESSURE: 95 MMHG | HEART RATE: 71 BPM | DIASTOLIC BLOOD PRESSURE: 61 MMHG

## 2024-11-14 DIAGNOSIS — I50.22 CHRONIC SYSTOLIC CONGESTIVE HEART FAILURE (HCC): ICD-10-CM

## 2024-11-14 DIAGNOSIS — I42.9 CARDIOMYOPATHY, UNSPECIFIED TYPE (HCC): Primary | ICD-10-CM

## 2024-11-14 DIAGNOSIS — I42.0 DILATED CARDIOMYOPATHY (HCC): Primary | ICD-10-CM

## 2024-11-14 PROCEDURE — 75574 CT ANGIO HRT W/3D IMAGE: CPT

## 2024-11-14 RX ORDER — NITROGLYCERIN 0.4 MG/1
0.8 TABLET SUBLINGUAL ONCE
Status: COMPLETED | OUTPATIENT
Start: 2024-11-14 | End: 2024-11-14

## 2024-11-14 RX ORDER — METOPROLOL TARTRATE 1 MG/ML
5 INJECTION, SOLUTION INTRAVENOUS
Status: DISCONTINUED | OUTPATIENT
Start: 2024-11-14 | End: 2024-11-15 | Stop reason: HOSPADM

## 2024-11-14 RX ADMIN — NITROGLYCERIN 0.8 MG: 0.4 TABLET, ORALLY DISINTEGRATING SUBLINGUAL at 13:32

## 2024-11-14 RX ADMIN — IOHEXOL 68 ML: 350 INJECTION, SOLUTION INTRAVENOUS at 13:40

## 2024-11-14 NOTE — TELEPHONE ENCOUNTER
"Patient scheduled for Left Heart Cath on 12/5/24 at [facility] with Dr. Reed.      I also called patient and informed of scheduled appt.    Instructions sent to patient through [employer].      Patient's friend Jeremiah aware of all general instructions.    Medication holds:   Lisinopril - Do not take day before and morning of procedure.     Spironolactone - Do not take morning of procedure.    Blood Thinners:   N/A    Labs to be done on: 11/22/24  CMP / CBC (fasting 8 hours)       Thank you,  Margie \"Stephany\" Segundo      "

## 2024-11-14 NOTE — PROGRESS NOTES
Patient underwent coronary CTA today.  I spoke with radiology regarding the results and reviewed his final result report.  He appears to have multivessel CAD.  There is reported 50% LAD, > 70% circumflex, > 70% RCA stenosis.  I called the patient today and relayed the results over the phone.  He continues to be chest pain-free and breathing is stable but he has ongoing fatigue and a lack of energy.  We discussed a heart catheterization and he is agreeable to proceed. He has new EF of 20%, abnormal NST, and now an abnormal cardiac CTA. We discussed that if he needs stenting he needs to be on uninterrupted aspirin and clopidogrel for the next year.  He says he is fine taking these medications.  We discussed that if he has multiple blockages not amenable to stenting then he might need to be considered for bypass surgery but this would be a discussion on the risk/benefit and the discussion would happen after the cath.    At this point he is agreeable to proceed.  He has requested that his friend Jeremiah (name and number in chart) be contacted for scheduling and transportation as patient himself is unable to arrange for transportation.  Jeremiah has in the past been very helpful taking Mr. Kiser to follow-up appointments/stress test/cardiac CTA.  I called Jeremiah today and left him a message to keep him in the loop.  He identifies himself by first and last name in his voicemail.    Case request placed for heart catheterization and message sent to scheduling this afternoon.

## 2024-11-14 NOTE — PROGRESS NOTES
Patient arrived for coronary CT angiography. Test education reviewed with patient. Medications and allergies verified. Pt denies PDE5 inhibitor use. Patient took lopressor as instructed by cardiology. SL nitro given per protocol. See vitals flowsheet. Tolerated test well. Instructed to increase fluid intake remainder of day. Vitals stable, patient asymptomatic upon departure with friend Jeremiah.

## 2024-11-14 NOTE — TELEPHONE ENCOUNTER
"----- Message from Lupillo House PA-C sent at 11/14/2024  3:44 PM EST -----  Regarding: Outpatient [facility]  Hello can this gentleman get set up for an outpatient cardiac catheterization at some point in the next few weeks?  He has new heart failure with a EF of 20% and abnormal cardiac CTA suggestive of multivessel CAD.  The patient himself is unable to arrange for transportation and request specifically that his friend Jeremiahhelga Allenue [phone] (listed on chart) be contacted to schedule the catheterization.  He is known to our group in [place] and Jeremiah has been available for scheduling and providing transportation for his previous tests.    I called both the patient and Jeremiah today and discussed with them.    Thanks very much!  Lupillo \"Shane\"LYNSEY House  "

## 2024-11-18 ENCOUNTER — NURSE TRIAGE (OUTPATIENT)
Age: 64
End: 2024-11-18

## 2024-11-18 NOTE — TELEPHONE ENCOUNTER
"Reason for Disposition   General information question, no triage required and triager able to answer question     Advised for the CMP it is ordered as fasting, may drink water in the morning, just no juice/coffee or food. The CBC doesn't ever require fasting. They can be done at the same time.    Advised may be dependent on the results and any intervention that may be needed as far as a follow up time frame.    Advised will alert the office about the appointment, see if they want to cancel it or possible reschedule it.    Answer Assessment - Initial Assessment Questions  1. REASON FOR CALL: \"What is the main reason for your call?\" or \"How can I best help you?\"      Humphrey has labs ordered, 1 says fasting, 1 says nonfasting.      3. OTHER QUESTIONS: \"Do you have any other questions?\"      Humphrey has a cardiac cath on 12-5-24 and also has a follow up with cardiology the same day. How soon after a cath do they usually see the patient? Should I cancel the follow up appointment for that day?    Protocols used: Information Only Call - No Triage-Adult-[place]    "

## 2024-11-18 NOTE — TELEPHONE ENCOUNTER
Spoke with patient's friend Jeremiah, he helps with scheduling type things.     Patient scheduled for cardiac cath on 12-5-24. He also has a follow up with Shane that same day at 1130am. Obviously he won't make it. Should it be cancelled or rescheduled? I'm not sure when he would need to be seen post cath to reschedule it to.    Jeremiah [phone]

## 2024-11-19 ENCOUNTER — APPOINTMENT (OUTPATIENT)
Dept: LAB | Facility: HOSPITAL | Age: 64
End: 2024-11-19
Payer: COMMERCIAL

## 2024-11-19 DIAGNOSIS — I50.22 CHRONIC SYSTOLIC CONGESTIVE HEART FAILURE (HCC): ICD-10-CM

## 2024-11-19 DIAGNOSIS — I50.9 CHF (CONGESTIVE HEART FAILURE) (HCC): ICD-10-CM

## 2024-11-19 LAB
ALBUMIN SERPL BCG-MCNC: 4.1 G/DL (ref 3.5–5)
ALP SERPL-CCNC: 92 U/L (ref 34–104)
ALT SERPL W P-5'-P-CCNC: 12 U/L (ref 7–52)
ANION GAP SERPL CALCULATED.3IONS-SCNC: 6 MMOL/L (ref 4–13)
AST SERPL W P-5'-P-CCNC: 15 U/L (ref 13–39)
BASOPHILS # BLD AUTO: 0.06 THOUSANDS/ÂΜL (ref 0–0.1)
BASOPHILS NFR BLD AUTO: 1 % (ref 0–1)
BILIRUB SERPL-MCNC: 0.48 MG/DL (ref 0.2–1)
BUN SERPL-MCNC: 20 MG/DL (ref 5–25)
CALCIUM SERPL-MCNC: 9.4 MG/DL (ref 8.4–10.2)
CHLORIDE SERPL-SCNC: 102 MMOL/L (ref 96–108)
CO2 SERPL-SCNC: 28 MMOL/L (ref 21–32)
CREAT SERPL-MCNC: 0.94 MG/DL (ref 0.6–1.3)
EOSINOPHIL # BLD AUTO: 0.69 THOUSAND/ÂΜL (ref 0–0.61)
EOSINOPHIL NFR BLD AUTO: 7 % (ref 0–6)
ERYTHROCYTE [DISTWIDTH] IN BLOOD BY AUTOMATED COUNT: 13.2 % (ref 11.6–15.1)
GFR SERPL CREATININE-BSD FRML MDRD: 85 ML/MIN/1.73SQ M
GLUCOSE P FAST SERPL-MCNC: 90 MG/DL (ref 65–99)
HCT VFR BLD AUTO: 44.4 % (ref 36.5–49.3)
HGB BLD-MCNC: 14 G/DL (ref 12–17)
IMM GRANULOCYTES # BLD AUTO: 0.04 THOUSAND/UL (ref 0–0.2)
IMM GRANULOCYTES NFR BLD AUTO: 0 % (ref 0–2)
LYMPHOCYTES # BLD AUTO: 2.14 THOUSANDS/ÂΜL (ref 0.6–4.47)
LYMPHOCYTES NFR BLD AUTO: 21 % (ref 14–44)
MCH RBC QN AUTO: 28.1 PG (ref 26.8–34.3)
MCHC RBC AUTO-ENTMCNC: 31.5 G/DL (ref 31.4–37.4)
MCV RBC AUTO: 89 FL (ref 82–98)
MONOCYTES # BLD AUTO: 0.99 THOUSAND/ÂΜL (ref 0.17–1.22)
MONOCYTES NFR BLD AUTO: 10 % (ref 4–12)
NEUTROPHILS # BLD AUTO: 6.47 THOUSANDS/ÂΜL (ref 1.85–7.62)
NEUTS SEG NFR BLD AUTO: 61 % (ref 43–75)
NRBC BLD AUTO-RTO: 0 /100 WBCS
PLATELET # BLD AUTO: 206 THOUSANDS/UL (ref 149–390)
PMV BLD AUTO: 9.9 FL (ref 8.9–12.7)
POTASSIUM SERPL-SCNC: 4.4 MMOL/L (ref 3.5–5.3)
PROT SERPL-MCNC: 7.7 G/DL (ref 6.4–8.4)
RBC # BLD AUTO: 4.99 MILLION/UL (ref 3.88–5.62)
SODIUM SERPL-SCNC: 136 MMOL/L (ref 135–147)
WBC # BLD AUTO: 10.39 THOUSAND/UL (ref 4.31–10.16)

## 2024-11-19 PROCEDURE — 85025 COMPLETE CBC W/AUTO DIFF WBC: CPT

## 2024-11-19 PROCEDURE — 80053 COMPREHEN METABOLIC PANEL: CPT

## 2024-11-19 PROCEDURE — 36415 COLL VENOUS BLD VENIPUNCTURE: CPT

## 2024-11-21 ENCOUNTER — HOSPITAL ENCOUNTER (OUTPATIENT)
Dept: NON INVASIVE DIAGNOSTICS | Facility: HOSPITAL | Age: 64
Discharge: HOME | End: 2024-11-21
Payer: COMMERCIAL

## 2024-11-21 DIAGNOSIS — I77.9 DISORDER OF ARTERIES AND ARTERIOLES, UNSPECIFIED (HCC): ICD-10-CM

## 2024-11-21 PROCEDURE — 93880 EXTRACRANIAL BILAT STUDY: CPT

## 2024-11-22 PROCEDURE — 93880 EXTRACRANIAL BILAT STUDY: CPT | Performed by: SURGERY

## 2024-12-05 ENCOUNTER — HOSPITAL ENCOUNTER (OUTPATIENT)
Facility: HOSPITAL | Age: 64
Setting detail: OUTPATIENT SURGERY
Discharge: HOME | End: 2024-12-06
Attending: INTERNAL MEDICINE | Admitting: INTERNAL MEDICINE
Payer: COMMERCIAL

## 2024-12-05 DIAGNOSIS — Z95.5 S/P DRUG ELUTING CORONARY STENT PLACEMENT: Primary | ICD-10-CM

## 2024-12-05 DIAGNOSIS — I42.0 DILATED CARDIOMYOPATHY (HCC): ICD-10-CM

## 2024-12-05 LAB
ANION GAP SERPL CALCULATED.3IONS-SCNC: 7 MMOL/L (ref 4–13)
ATRIAL RATE: 63 BPM
ATRIAL RATE: 67 BPM
BUN SERPL-MCNC: 23 MG/DL (ref 5–25)
CALCIUM SERPL-MCNC: 9.5 MG/DL (ref 8.4–10.2)
CHLORIDE SERPL-SCNC: 105 MMOL/L (ref 96–108)
CO2 SERPL-SCNC: 24 MMOL/L (ref 21–32)
CREAT SERPL-MCNC: 1 MG/DL (ref 0.6–1.3)
ERYTHROCYTE [DISTWIDTH] IN BLOOD BY AUTOMATED COUNT: 13.7 % (ref 11.6–15.1)
GFR SERPL CREATININE-BSD FRML MDRD: 79 ML/MIN/1.73SQ M
GLUCOSE P FAST SERPL-MCNC: 95 MG/DL (ref 65–99)
GLUCOSE SERPL-MCNC: 95 MG/DL (ref 65–140)
HCT VFR BLD AUTO: 40.5 % (ref 36.5–49.3)
HGB BLD-MCNC: 13.2 G/DL (ref 12–17)
KCT BLD-ACNC: 268 SEC (ref 89–137)
KCT BLD-ACNC: 281 SEC (ref 89–137)
KCT BLD-ACNC: 338 SEC (ref 89–137)
MCH RBC QN AUTO: 28.4 PG (ref 26.8–34.3)
MCHC RBC AUTO-ENTMCNC: 32.6 G/DL (ref 31.4–37.4)
MCV RBC AUTO: 87 FL (ref 82–98)
P AXIS: 51 DEGREES
P AXIS: 55 DEGREES
PLATELET # BLD AUTO: 207 THOUSANDS/UL (ref 149–390)
PMV BLD AUTO: 9.6 FL (ref 8.9–12.7)
POTASSIUM SERPL-SCNC: 4.6 MMOL/L (ref 3.5–5.3)
PR INTERVAL: 178 MS
PR INTERVAL: 180 MS
QRS AXIS: -22 DEGREES
QRS AXIS: -39 DEGREES
QRSD INTERVAL: 90 MS
QRSD INTERVAL: 92 MS
QT INTERVAL: 404 MS
QT INTERVAL: 460 MS
QTC INTERVAL: 427 MS
QTC INTERVAL: 471 MS
RBC # BLD AUTO: 4.65 MILLION/UL (ref 3.88–5.62)
SODIUM SERPL-SCNC: 136 MMOL/L (ref 135–147)
SPECIMEN SOURCE: ABNORMAL
T WAVE AXIS: 36 DEGREES
T WAVE AXIS: 61 DEGREES
VENTRICULAR RATE: 63 BPM
VENTRICULAR RATE: 67 BPM
WBC # BLD AUTO: 12.85 THOUSAND/UL (ref 4.31–10.16)

## 2024-12-05 PROCEDURE — C1874 STENT, COATED/COV W/DEL SYS: HCPCS | Performed by: INTERNAL MEDICINE

## 2024-12-05 PROCEDURE — 99152 MOD SED SAME PHYS/QHP 5/>YRS: CPT | Performed by: INTERNAL MEDICINE

## 2024-12-05 PROCEDURE — C1725 CATH, TRANSLUMIN NON-LASER: HCPCS | Performed by: INTERNAL MEDICINE

## 2024-12-05 PROCEDURE — NC001 PR NO CHARGE: Performed by: INTERNAL MEDICINE

## 2024-12-05 PROCEDURE — 93458 L HRT ARTERY/VENTRICLE ANGIO: CPT | Performed by: INTERNAL MEDICINE

## 2024-12-05 PROCEDURE — C9601 PERC DRUG-EL COR STENT BRAN: HCPCS | Performed by: INTERNAL MEDICINE

## 2024-12-05 PROCEDURE — C1887 CATHETER, GUIDING: HCPCS | Performed by: INTERNAL MEDICINE

## 2024-12-05 PROCEDURE — 80048 BASIC METABOLIC PNL TOTAL CA: CPT | Performed by: PHYSICIAN ASSISTANT

## 2024-12-05 PROCEDURE — 85347 COAGULATION TIME ACTIVATED: CPT

## 2024-12-05 PROCEDURE — 92979 ENDOLUMINL IVUS OCT C EA: CPT | Performed by: INTERNAL MEDICINE

## 2024-12-05 PROCEDURE — 99153 MOD SED SAME PHYS/QHP EA: CPT | Performed by: INTERNAL MEDICINE

## 2024-12-05 PROCEDURE — 92978 ENDOLUMINL IVUS OCT C 1ST: CPT | Performed by: INTERNAL MEDICINE

## 2024-12-05 PROCEDURE — C1769 GUIDE WIRE: HCPCS | Performed by: INTERNAL MEDICINE

## 2024-12-05 PROCEDURE — 93005 ELECTROCARDIOGRAM TRACING: CPT

## 2024-12-05 PROCEDURE — 85027 COMPLETE CBC AUTOMATED: CPT | Performed by: STUDENT IN AN ORGANIZED HEALTH CARE EDUCATION/TRAINING PROGRAM

## 2024-12-05 PROCEDURE — 93010 ELECTROCARDIOGRAM REPORT: CPT | Performed by: INTERNAL MEDICINE

## 2024-12-05 PROCEDURE — 92928 PRQ TCAT PLMT NTRAC ST 1 LES: CPT | Performed by: INTERNAL MEDICINE

## 2024-12-05 PROCEDURE — C1894 INTRO/SHEATH, NON-LASER: HCPCS | Performed by: INTERNAL MEDICINE

## 2024-12-05 PROCEDURE — C9600 PERC DRUG-EL COR STENT SING: HCPCS | Performed by: INTERNAL MEDICINE

## 2024-12-05 PROCEDURE — C1753 CATH, INTRAVAS ULTRASOUND: HCPCS | Performed by: INTERNAL MEDICINE

## 2024-12-05 DEVICE — XIENCE SKYPOINT™ EVEROLIMUS ELUTING CORONARY STENT SYSTEM 3.25 MM X 18 MM / RAPID-EXCHANGE
Type: IMPLANTABLE DEVICE | Site: CORONARY | Status: FUNCTIONAL
Brand: XIENCE SKYPOINT™

## 2024-12-05 DEVICE — XIENCE SKYPOINT™ EVEROLIMUS ELUTING CORONARY STENT SYSTEM 3.25 MM X 38 MM / RAPID-EXCHANGE
Type: IMPLANTABLE DEVICE | Site: CORONARY | Status: FUNCTIONAL
Brand: XIENCE SKYPOINT™

## 2024-12-05 RX ORDER — CLOPIDOGREL BISULFATE 75 MG/1
75 TABLET ORAL DAILY
Status: DISCONTINUED | OUTPATIENT
Start: 2024-12-06 | End: 2024-12-06 | Stop reason: HOSPADM

## 2024-12-05 RX ORDER — SENNOSIDES 8.6 MG
1 TABLET ORAL DAILY
Status: DISCONTINUED | OUTPATIENT
Start: 2024-12-06 | End: 2024-12-06 | Stop reason: HOSPADM

## 2024-12-05 RX ORDER — ATORVASTATIN CALCIUM 40 MG/1
40 TABLET, FILM COATED ORAL
Status: DISCONTINUED | OUTPATIENT
Start: 2024-12-05 | End: 2024-12-06 | Stop reason: HOSPADM

## 2024-12-05 RX ORDER — ONDANSETRON 2 MG/ML
4 INJECTION INTRAMUSCULAR; INTRAVENOUS EVERY 6 HOURS PRN
Status: DISCONTINUED | OUTPATIENT
Start: 2024-12-05 | End: 2024-12-06 | Stop reason: HOSPADM

## 2024-12-05 RX ORDER — MIDAZOLAM HYDROCHLORIDE 2 MG/2ML
INJECTION, SOLUTION INTRAMUSCULAR; INTRAVENOUS CODE/TRAUMA/SEDATION MEDICATION
Status: DISCONTINUED | OUTPATIENT
Start: 2024-12-05 | End: 2024-12-05 | Stop reason: HOSPADM

## 2024-12-05 RX ORDER — ASPIRIN 81 MG/1
81 TABLET ORAL DAILY
Status: DISCONTINUED | OUTPATIENT
Start: 2024-12-06 | End: 2024-12-06 | Stop reason: HOSPADM

## 2024-12-05 RX ORDER — DOCUSATE SODIUM 100 MG/1
100 CAPSULE, LIQUID FILLED ORAL 2 TIMES DAILY
Status: DISCONTINUED | OUTPATIENT
Start: 2024-12-05 | End: 2024-12-06 | Stop reason: HOSPADM

## 2024-12-05 RX ORDER — SODIUM CHLORIDE 9 MG/ML
50 INJECTION, SOLUTION INTRAVENOUS CONTINUOUS
Status: DISPENSED | OUTPATIENT
Start: 2024-12-05 | End: 2024-12-05

## 2024-12-05 RX ORDER — CLOPIDOGREL BISULFATE 75 MG
75 TABLET ORAL DAILY
Qty: 30 TABLET | Refills: 1 | Status: CANCELLED | OUTPATIENT
Start: 2024-12-06

## 2024-12-05 RX ORDER — HEPARIN SODIUM 1000 [USP'U]/ML
INJECTION, SOLUTION INTRAVENOUS; SUBCUTANEOUS CODE/TRAUMA/SEDATION MEDICATION
Status: DISCONTINUED | OUTPATIENT
Start: 2024-12-05 | End: 2024-12-05 | Stop reason: HOSPADM

## 2024-12-05 RX ORDER — ACETAMINOPHEN 325 MG/1
650 TABLET ORAL EVERY 4 HOURS PRN
Status: DISCONTINUED | OUTPATIENT
Start: 2024-12-05 | End: 2024-12-06 | Stop reason: HOSPADM

## 2024-12-05 RX ORDER — SODIUM CHLORIDE 9 MG/ML
75 INJECTION, SOLUTION INTRAVENOUS CONTINUOUS
Status: CANCELLED | OUTPATIENT
Start: 2024-12-05 | End: 2024-12-05

## 2024-12-05 RX ORDER — CALCIUM CARBONATE 500 MG/1
1000 TABLET, CHEWABLE ORAL DAILY PRN
Status: DISCONTINUED | OUTPATIENT
Start: 2024-12-05 | End: 2024-12-06 | Stop reason: HOSPADM

## 2024-12-05 RX ORDER — CLOPIDOGREL BISULFATE 75 MG/1
TABLET ORAL CODE/TRAUMA/SEDATION MEDICATION
Status: DISCONTINUED | OUTPATIENT
Start: 2024-12-05 | End: 2024-12-05 | Stop reason: HOSPADM

## 2024-12-05 RX ORDER — NITROGLYCERIN 20 MG/100ML
INJECTION INTRAVENOUS CODE/TRAUMA/SEDATION MEDICATION
Status: DISCONTINUED | OUTPATIENT
Start: 2024-12-05 | End: 2024-12-05 | Stop reason: HOSPADM

## 2024-12-05 RX ORDER — LIDOCAINE HYDROCHLORIDE 10 MG/ML
INJECTION, SOLUTION EPIDURAL; INFILTRATION; INTRACAUDAL; PERINEURAL CODE/TRAUMA/SEDATION MEDICATION
Status: DISCONTINUED | OUTPATIENT
Start: 2024-12-05 | End: 2024-12-05 | Stop reason: HOSPADM

## 2024-12-05 RX ORDER — METOPROLOL SUCCINATE 25 MG/1
25 TABLET, EXTENDED RELEASE ORAL DAILY
Status: DISCONTINUED | OUTPATIENT
Start: 2024-12-06 | End: 2024-12-06 | Stop reason: HOSPADM

## 2024-12-05 RX ORDER — FENTANYL CITRATE 50 UG/ML
INJECTION, SOLUTION INTRAMUSCULAR; INTRAVENOUS CODE/TRAUMA/SEDATION MEDICATION
Status: DISCONTINUED | OUTPATIENT
Start: 2024-12-05 | End: 2024-12-05 | Stop reason: HOSPADM

## 2024-12-05 RX ORDER — NITROGLYCERIN 0.4 MG/1
0.4 TABLET SUBLINGUAL
Status: DISCONTINUED | OUTPATIENT
Start: 2024-12-05 | End: 2024-12-06 | Stop reason: HOSPADM

## 2024-12-05 RX ORDER — ASPIRIN 81 MG/1
324 TABLET, CHEWABLE ORAL ONCE
Status: COMPLETED | OUTPATIENT
Start: 2024-12-05 | End: 2024-12-05

## 2024-12-05 RX ORDER — VERAPAMIL HYDROCHLORIDE 2.5 MG/ML
INJECTION, SOLUTION INTRAVENOUS CODE/TRAUMA/SEDATION MEDICATION
Status: DISCONTINUED | OUTPATIENT
Start: 2024-12-05 | End: 2024-12-05 | Stop reason: HOSPADM

## 2024-12-05 RX ORDER — SODIUM CHLORIDE 9 MG/ML
125 INJECTION, SOLUTION INTRAVENOUS CONTINUOUS
Status: DISCONTINUED | OUTPATIENT
Start: 2024-12-05 | End: 2024-12-06 | Stop reason: HOSPADM

## 2024-12-05 RX ADMIN — ACETAMINOPHEN 650 MG: 325 TABLET, FILM COATED ORAL at 17:50

## 2024-12-05 RX ADMIN — DOCUSATE SODIUM 100 MG: 100 CAPSULE, LIQUID FILLED ORAL at 17:50

## 2024-12-05 RX ADMIN — SODIUM CHLORIDE 125 ML/HR: 0.9 INJECTION, SOLUTION INTRAVENOUS at 10:53

## 2024-12-05 RX ADMIN — ATORVASTATIN CALCIUM 40 MG: 40 TABLET, FILM COATED ORAL at 17:50

## 2024-12-05 RX ADMIN — ASPIRIN 81 MG CHEWABLE TABLET 324 MG: 81 TABLET CHEWABLE at 10:52

## 2024-12-05 NOTE — H&P
"H&P Exam - Cardiology   Humphrey Kiser 64 y.o. male MRN: [id number]  Unit/Bed#: BE CATH LAB ROOM Encounter: [id number]     Office cardiologist:Lupillo House PA-C     PCP:Douglas C Shoenberger, MD (Inactive)      Assessment & Plan   Heart failure with reduced ejection fraction  Tobacco use disorder/alcohol use disorder  History of stroke  History of depression    Patient seen and examined at bedside.    Brief overview of procedure discussed with patient.  Indication, risk, benefits and alternatives discussed with patient who verbalized understanding.  All questions addressed fully.  Patient elected to proceed with planned procedure, consent completed.    Patient remains clinically stable.  Renal function, coagulation profile, and hemoglobin all within normal limits.  Patient has no planned upcoming surgical procedures at this time.    We will proceed with planned procedure.      /79 (BP Location: Right arm)   Pulse 69   Temp 98.1 °F (36.7 °C) (Oral)   Resp 16   Ht 5' 9\" (1.753 m)   Wt 79.1 kg (174 lb 4.8 oz)   SpO2 97%   BMI 25.74 kg/m²     History of Present Illness   HPI:  Humphrey Kiser is a 64 y.o. male with past medical history significant for heart failure with reduced ejection fraction with last known ejection fraction of 20% who continues to refuse ICD, tobacco use disorder, stroke, prolonged QT, depression, and elevated blood pressure presenting to the hospital for invasive coronary assessment following an abnormal coronary CTA.  Coronary CTA showed severe greater than 70% atherosclerotic stenosis in the left circumflex and mid right coronary arteries.  There is a 50% LAD stenotic lesion.  Based on this, patient sent for further assessment with coronary angiography denies any prior coronary evaluation or Intervention in the past.  Denies any symptoms of chest pain.  Patient continues to experience significant fatigue with minimal    Historical Information   Past Medical History: "   Diagnosis Date    Colon polyp     Hyperlipidemia     Stroke (HCC)     left sided numbness residual     Past Surgical History:   Procedure Laterality Date    COLONOSCOPY      HERNIA REPAIR Left     inguinal     Social History   Social History     Substance and Sexual Activity   Alcohol Use Yes    Alcohol/week: 14.0 standard drinks of alcohol    Types: 14 Glasses of wine per week     Social History     Substance and Sexual Activity   Drug Use Yes    Frequency: 1.0 times per week    Types: Marijuana     Social History     Tobacco Use   Smoking Status Every Day    Current packs/day: 0.00    Types: Pipe, Cigarettes    Last attempt to quit: 10/29/2005    Years since quittin.1   Smokeless Tobacco Never   Tobacco Comments    patient is currently using pipe     Family History:   Family History   Problem Relation Age of Onset    Arthritis Mother     Alcohol abuse Father     Mental illness Father     Alcohol abuse Sister     Alcohol abuse Brother        Meds/Allergies   all medications and allergies reviewed  Allergies   Allergen Reactions    Penicillins Hives       Review of Systems   Constitutional:  Negative for chills and fever.   HENT:  Negative for ear pain and sore throat.    Eyes:  Negative for pain and visual disturbance.   Respiratory:  Positive for shortness of breath. Negative for cough.    Cardiovascular:  Negative for chest pain and palpitations.   Gastrointestinal:  Negative for abdominal pain and vomiting.   Genitourinary:  Negative for dysuria and hematuria.   Musculoskeletal:  Negative for arthralgias and back pain.   Skin:  Negative for color change and rash.   Neurological:  Negative for seizures and syncope.   All other systems reviewed and are negative.      Physical Exam  Vitals reviewed.   Constitutional:       Appearance: Normal appearance.   HENT:      Head: Normocephalic and atraumatic.      Nose: Nose normal.   Eyes:      Extraocular Movements: Extraocular movements intact.       Conjunctiva/sclera: Conjunctivae normal.      Pupils: Pupils are equal, round, and reactive to light.   Cardiovascular:      Rate and Rhythm: Normal rate and regular rhythm.   Pulmonary:      Effort: Pulmonary effort is normal.      Breath sounds: Normal breath sounds.   Abdominal:      General: Abdomen is flat. Bowel sounds are normal.      Palpations: Abdomen is soft.   Musculoskeletal:         General: Normal range of motion.      Cervical back: Normal range of motion and neck supple.   Skin:     General: Skin is warm and dry.      Capillary Refill: Capillary refill takes less than 2 seconds.   Neurological:      General: No focal deficit present.      Mental Status: He is alert and oriented to person, place, and time. Mental status is at baseline.         EKG: Normal sinus rhythm  ECHO: Reduced EF, 20%, no significant valvular heart disease.  Previous Cath/PCI: Denies any previous cardiac catheterizations.    Arjun Juarez

## 2024-12-05 NOTE — DISCHARGE INSTR - AVS FIRST PAGE
1. Please see the post cardiac catheterization dishcarge instructions.   No heavy lifting, greater than 10 lbs. or strenuous  activity for 48 hrs.    2.Remove band aid tomorrow.  Shower and wash area- wrist gently with soap and water- beginning tomorrow. Rinse and pat dry.  Apply new water seal band aid.  Repeat this process for 5 days. No powders, creams lotions or antibiotic ointments  for 5 days.  No tub baths, hot tubs or swimming for 5 days.     3. Please call our office ([phone]) if you have any fever, redness, swelling, discharge from your wrist access site.    4.No driving for 1 day

## 2024-12-06 VITALS
DIASTOLIC BLOOD PRESSURE: 95 MMHG | HEIGHT: 69 IN | OXYGEN SATURATION: 96 % | TEMPERATURE: 98.2 F | HEART RATE: 76 BPM | WEIGHT: 174.3 LBS | BODY MASS INDEX: 25.82 KG/M2 | SYSTOLIC BLOOD PRESSURE: 164 MMHG | RESPIRATION RATE: 19 BRPM

## 2024-12-06 PROBLEM — I25.10 CORONARY ARTERY DISEASE INVOLVING NATIVE CORONARY ARTERY: Status: ACTIVE | Noted: 2024-12-06

## 2024-12-06 LAB
ANION GAP SERPL CALCULATED.3IONS-SCNC: 9 MMOL/L (ref 4–13)
BUN SERPL-MCNC: 21 MG/DL (ref 5–25)
CALCIUM SERPL-MCNC: 9.3 MG/DL (ref 8.4–10.2)
CHLORIDE SERPL-SCNC: 103 MMOL/L (ref 96–108)
CO2 SERPL-SCNC: 23 MMOL/L (ref 21–32)
CREAT SERPL-MCNC: 0.97 MG/DL (ref 0.6–1.3)
ERYTHROCYTE [DISTWIDTH] IN BLOOD BY AUTOMATED COUNT: 13.7 % (ref 11.6–15.1)
GFR SERPL CREATININE-BSD FRML MDRD: 82 ML/MIN/1.73SQ M
GLUCOSE SERPL-MCNC: 96 MG/DL (ref 65–140)
HCT VFR BLD AUTO: 42.8 % (ref 36.5–49.3)
HGB BLD-MCNC: 14.2 G/DL (ref 12–17)
MAGNESIUM SERPL-MCNC: 2 MG/DL (ref 1.9–2.7)
MCH RBC QN AUTO: 28.5 PG (ref 26.8–34.3)
MCHC RBC AUTO-ENTMCNC: 33.2 G/DL (ref 31.4–37.4)
MCV RBC AUTO: 86 FL (ref 82–98)
PLATELET # BLD AUTO: 227 THOUSANDS/UL (ref 149–390)
PMV BLD AUTO: 9.7 FL (ref 8.9–12.7)
POTASSIUM SERPL-SCNC: 4.2 MMOL/L (ref 3.5–5.3)
RBC # BLD AUTO: 4.98 MILLION/UL (ref 3.88–5.62)
SODIUM SERPL-SCNC: 135 MMOL/L (ref 135–147)
WBC # BLD AUTO: 11.96 THOUSAND/UL (ref 4.31–10.16)

## 2024-12-06 PROCEDURE — 85027 COMPLETE CBC AUTOMATED: CPT | Performed by: INTERNAL MEDICINE

## 2024-12-06 PROCEDURE — 83735 ASSAY OF MAGNESIUM: CPT | Performed by: STUDENT IN AN ORGANIZED HEALTH CARE EDUCATION/TRAINING PROGRAM

## 2024-12-06 PROCEDURE — 80048 BASIC METABOLIC PNL TOTAL CA: CPT | Performed by: STUDENT IN AN ORGANIZED HEALTH CARE EDUCATION/TRAINING PROGRAM

## 2024-12-06 PROCEDURE — NC001 PR NO CHARGE: Performed by: INTERNAL MEDICINE

## 2024-12-06 RX ORDER — CLOPIDOGREL BISULFATE 75 MG/1
75 TABLET ORAL DAILY
Qty: 30 TABLET | Refills: 11 | Status: SHIPPED | OUTPATIENT
Start: 2024-12-07

## 2024-12-06 RX ADMIN — ASPIRIN 81 MG: 81 TABLET, COATED ORAL at 08:50

## 2024-12-06 RX ADMIN — METOPROLOL SUCCINATE 25 MG: 25 TABLET, EXTENDED RELEASE ORAL at 08:50

## 2024-12-06 RX ADMIN — CLOPIDOGREL BISULFATE 75 MG: 75 TABLET, FILM COATED ORAL at 08:50

## 2024-12-06 RX ADMIN — DOCUSATE SODIUM 100 MG: 100 CAPSULE, LIQUID FILLED ORAL at 08:50

## 2024-12-06 RX ADMIN — SENNOSIDES 8.6 MG: 8.6 TABLET, FILM COATED ORAL at 08:50

## 2024-12-06 NOTE — PLAN OF CARE
Problem: CARDIOVASCULAR - ADULT  Goal: Maintains optimal cardiac output and hemodynamic stability  Description: INTERVENTIONS:  - Monitor I/O, vital signs and rhythm  - Monitor for S/S and trends of decreased cardiac output  - Administer and titrate ordered vasoactive medications to optimize hemodynamic stability  - Assess quality of pulses, skin color and temperature  - Assess for signs of decreased coronary artery perfusion  - Instruct patient to report change in severity of symptoms  Outcome: Progressing  Goal: Absence of cardiac dysrhythmias or at baseline rhythm  Description: INTERVENTIONS:  - Continuous cardiac monitoring, vital signs, obtain 12 lead EKG if ordered  - Administer antiarrhythmic and heart rate control medications as ordered  - Monitor electrolytes and administer replacement therapy as ordered  Outcome: Progressing     Problem: DISCHARGE PLANNING  Goal: Discharge to home or other facility with appropriate resources  Description: INTERVENTIONS:  - Identify barriers to discharge w/patient and caregiver  - Arrange for needed discharge resources and transportation as appropriate  - Identify discharge learning needs (meds, wound care, etc.)  - Arrange for interpretive services to assist at discharge as needed  - Refer to Case Management Department for coordinating discharge planning if the patient needs post-hospital services based on physician/advanced practitioner order or complex needs related to functional status, cognitive ability, or social support system  Outcome: Progressing

## 2024-12-06 NOTE — DISCHARGE SUMMARY
"Discharge Summary - Humphrey Kiser 64 y.o. male MRN: [id number]    Unit/Bed#: [facility] 402-01 Encounter: [id number]    Admission Date: 12/5/2024   Discharge Date: 12/6/2024    Disposition: Home    Condition at Discharge:      PCP:Douglas C Shoenberger, MD (Inactive)  OP Cardiologist:Dr Islas    Interventional cardiologist: Dr. Reed    Admitting Diagnosis:   HFrEF EF 20 %  Secondary Diagnoses:   History of stroke   Hyperlipidemia    Discharge Diagnosis:  CAD   S/P IVUS Guided Bifurcation PCI with LARY x2 to the LCX/OM 80%/70% lesion     /82   Pulse 65   Temp 98.8 °F (37.1 °C)   Resp 18   Ht 5' 9\" (1.753 m)   Wt 79.1 kg (174 lb 4.8 oz)   SpO2 97%   BMI 25.74 kg/m²       Review of Systems   Constitutional: Negative.    HENT: Negative.     Eyes: Negative.    Respiratory: Negative.     Gastrointestinal: Negative.    Musculoskeletal: Negative.    Skin: Negative.    Neurological: Negative.    Psychiatric/Behavioral: Negative.         Physical Exam  Vitals reviewed.   HENT:      Head: Normocephalic and atraumatic.      Mouth/Throat:      Mouth: Mucous membranes are dry.   Cardiovascular:      Rate and Rhythm: Normal rate and regular rhythm.      Pulses: Normal pulses.      Heart sounds: Normal heart sounds.   Pulmonary:      Effort: Pulmonary effort is normal.      Breath sounds: Normal breath sounds.   Abdominal:      General: Abdomen is flat. Bowel sounds are normal.      Palpations: Abdomen is soft.   Musculoskeletal:      Right lower leg: Edema present.      Left lower leg: No edema.   Skin:     General: Skin is dry.      Capillary Refill: Capillary refill takes 2 to 3 seconds.   Neurological:      Mental Status: He is alert.      Right Radial artery has  + 2 pulse with brisk capillary refill.  Neurovascular intact to  Right hand.  B/L PT and DP pulses +1        HPI and Hospital Course:   64 y.o. male with past medical history significant for heart failure with reduced ejection fraction with last known " ejection fraction of 20% who continues to refuse ICD, tobacco use disorder, stroke, prolonged QT, depression, and elevated blood pressure presenting to the hospital for invasive coronary assessment following an abnormal coronary CTA.  Coronary CTA showed severe greater than 70% atherosclerotic stenosis in the left circumflex and mid right coronary arteries.  There is a 50% LAD stenotic lesion.  Based on this, patient sent for further assessment with coronary angiography denies any prior coronary evaluation or Intervention in the past.  Denies any symptoms of chest pain.  Patient continues to experience significant fatigue with minimal     Patient was electively admitted for cardiac cath to evaluate his coronary anatomy and ischemic burden.     Results of the cath:     S/P IVUS Guided Bifurcation PCI with LARY x2 to the LCX/OM 80%/70% lesion    RCA is moderately diseaed through the Prox/Mid Segment with noted anuryamsal findings    LVEDP is normal without gradient on LV-AO pullback    1st Mrg lesion is 70% stenosed.    Prox Cx to Mid Cx lesion is 80% stenosed.    The angioplasty was pre-stent angioplasty.    The angioplasty was pre-stent angioplasty.      Intervention:  S/P IVUS Guided Bifurcation PCI with LARY x2 to the LCX/OM 80%/70% lesion   Right radial artery utilized for procedure    Discharge:  H & H 14.2 & 42.8  CR 0.97 GFR 82  Discharge meds include:  ASA 81 mg daily  Plavix 75 mg daily  Toprol XL 25 mg daily  Lipitor 40 mg daily  Cardiac rehab referral will be made.  Patient has refused Life Vest previously and became upset /angry.  He states he cannot do anything with his right hand secondary to his CVA in 2015.  He does have a fair  in that hand.    Current Facility-Administered Medications:     acetaminophen (TYLENOL) tablet 650 mg, Q4H PRN    aspirin (ECOTRIN LOW STRENGTH) EC tablet 81 mg, Daily    atorvastatin (LIPITOR) tablet 40 mg, After Dinner    calcium carbonate (TUMS) chewable tablet 1,000 mg,  Daily PRN    clopidogrel (PLAVIX) tablet 75 mg, Daily    docusate sodium (COLACE) capsule 100 mg, BID    metoprolol succinate (TOPROL-XL) 24 hr tablet 25 mg, Daily    nitroglycerin (NITROSTAT) SL tablet 0.4 mg, Q5 Min PRN    ondansetron (ZOFRAN) injection 4 mg, Q6H PRN    senna (SENOKOT) tablet 8.6 mg, Daily    sodium chloride 0.9 % infusion, Continuous, Last Rate: Stopped (12/05/24 1429)    Pertinent Labs/diagnostics:        Lab Ressults:  Recent Results (from the past 24 hours)   Basic metabolic panel    Collection Time: 12/05/24 10:51 AM   Result Value Ref Range    Sodium 136 135 - 147 mmol/L    Potassium 4.6 3.5 - 5.3 mmol/L    Chloride 105 96 - 108 mmol/L    CO2 24 21 - 32 mmol/L    ANION GAP 7 4 - 13 mmol/L    BUN 23 5 - 25 mg/dL    Creatinine 1.00 0.60 - 1.30 mg/dL    Glucose 95 65 - 140 mg/dL    Glucose, Fasting 95 65 - 99 mg/dL    Calcium 9.5 8.4 - 10.2 mg/dL    eGFR 79 ml/min/1.73sq m   ECG 12 lead    Collection Time: 12/05/24 11:00 AM   Result Value Ref Range    Ventricular Rate 67 BPM    Atrial Rate 67 BPM    CA Interval 178 ms    QRSD Interval 90 ms    QT Interval 404 ms    QTC Interval 427 ms    P [place] 51 degrees    QRS Axis -22 degrees    T Wave Axis 61 degrees   POCT activated clotting time    Collection Time: 12/05/24  1:21 PM   Result Value Ref Range    Activated Clotting Time, i-STAT 281 (H) 89 - 137 sec    Specimen Type VENOUS    POCT activated clotting time    Collection Time: 12/05/24  1:49 PM   Result Value Ref Range    Activated Clotting Time, i-STAT 268 (H) 89 - 137 sec    Specimen Type VENOUS    POCT activated clotting time    Collection Time: 12/05/24  2:16 PM   Result Value Ref Range    Activated Clotting Time, i-STAT 338 (H) 89 - 137 sec    Specimen Type VENOUS    ECG 12 lead    Collection Time: 12/05/24  2:33 PM   Result Value Ref Range    Ventricular Rate 63 BPM    Atrial Rate 63 BPM    CA Interval 180 ms    QRSD Interval 92 ms    QT Interval 460 ms    QTC Interval 471 ms    P Axis 55  "degrees    QRS Axis -39 degrees    T Wave Axis 36 degrees   CBC    Collection Time: 12/05/24  2:56 PM   Result Value Ref Range    WBC 12.85 (H) 4.31 - 10.16 Thousand/uL    RBC 4.65 3.88 - 5.62 Million/uL    Hemoglobin 13.2 12.0 - 17.0 g/dL    Hematocrit 40.5 36.5 - 49.3 %    MCV 87 82 - 98 fL    MCH 28.4 26.8 - 34.3 pg    MCHC 32.6 31.4 - 37.4 g/dL    RDW 13.7 11.6 - 15.1 %    Platelets 207 149 - 390 Thousands/uL    MPV 9.6 8.9 - 12.7 fL   Basic metabolic panel    Collection Time: 12/06/24  5:38 AM   Result Value Ref Range    Sodium 135 135 - 147 mmol/L    Potassium 4.2 3.5 - 5.3 mmol/L    Chloride 103 96 - 108 mmol/L    CO2 23 21 - 32 mmol/L    ANION GAP 9 4 - 13 mmol/L    BUN 21 5 - 25 mg/dL    Creatinine 0.97 0.60 - 1.30 mg/dL    Glucose 96 65 - 140 mg/dL    Calcium 9.3 8.4 - 10.2 mg/dL    eGFR 82 ml/min/1.73sq m   Magnesium    Collection Time: 12/06/24  5:38 AM   Result Value Ref Range    Magnesium 2.0 1.9 - 2.7 mg/dL           Lipid Profile:   No results found for: \"CHOL\"  Lab Results   Component Value Date    HDL 34 (L) 10/11/2024     Lab Results   Component Value Date    LDLCALC 89 10/11/2024     Lab Results   Component Value Date    TRIG 107 10/11/2024         Tele:NSR      Discharge instructions/Information to patient and family:   See after visit summary for information provided to patient and family.      Provisions for Follow-Up Care:  See after visit summary for information related to follow-up care and any pertinent home health orders.      Planned Readmission: No    Discharge Statement:  I spent 45 minutes minutes discharging the patient. This time was spent on the day of discharge. I had direct contact with the patient on the day of discharge. Additional documentation is required if more than 30 minutes were spent on discharge.           "

## 2024-12-10 ENCOUNTER — RESULTS FOLLOW-UP (OUTPATIENT)
Dept: NON INVASIVE DIAGNOSTICS | Facility: HOSPITAL | Age: 64
End: 2024-12-10

## 2025-01-07 ENCOUNTER — OFFICE VISIT (OUTPATIENT)
Dept: CARDIOLOGY CLINIC | Facility: CLINIC | Age: 65
End: 2025-01-07
Payer: COMMERCIAL

## 2025-01-07 VITALS
DIASTOLIC BLOOD PRESSURE: 72 MMHG | SYSTOLIC BLOOD PRESSURE: 126 MMHG | HEIGHT: 69 IN | WEIGHT: 165 LBS | BODY MASS INDEX: 24.44 KG/M2 | HEART RATE: 75 BPM

## 2025-01-07 DIAGNOSIS — I50.20 HEART FAILURE WITH REDUCED EJECTION FRACTION (HCC): ICD-10-CM

## 2025-01-07 DIAGNOSIS — E78.5 HYPERLIPIDEMIA, UNSPECIFIED HYPERLIPIDEMIA TYPE: ICD-10-CM

## 2025-01-07 DIAGNOSIS — Z72.0 TOBACCO ABUSE: ICD-10-CM

## 2025-01-07 DIAGNOSIS — I25.119 CORONARY ARTERY DISEASE INVOLVING NATIVE CORONARY ARTERY OF NATIVE HEART WITH ANGINA PECTORIS (HCC): Primary | ICD-10-CM

## 2025-01-07 PROCEDURE — 99214 OFFICE O/P EST MOD 30 MIN: CPT

## 2025-01-07 NOTE — PROGRESS NOTES
Cardiology Follow Up    Humphrey Kiser  1960  [id number]  [facility] [place] [id number]  [facility] [place]  [facility] [id number] [phone]    1. Coronary artery disease involving native coronary artery of native heart with angina pectoris (HCC)        2. Heart failure with reduced ejection fraction (HCC)        3. Tobacco abuse        4. Hyperlipidemia, unspecified hyperlipidemia type          Discussion/Summary:  1.)  Coronary artery disease: Patient status post PCI to proximal/mid circumflex and OM1 with drug-eluting stent x 2 on 12/5/2024.  Patient with mild residual RCA disease per cath report.  Patient maintained on Plavix 75 mg daily, aspirin 81 mg daily, atorvastatin 40 mg daily, metoprolol 25 mg daily.  He reports compliance with his daily medications.    2.)  HFrEF: Echocardiogram on 10/14/2024 showed ejection fraction of 20%, grade 2 diastolic dysfunction, LA dilatation, mild AS, trace MR.  LVEDP was normal at time of cardiac catheterization.  Etiology ischemia, alcohol use    GDMT  Renin-angiotensin system inhibitor: Lisinopril 10 mg daily  Beta-blockade: Metoprolol succinate 25 mg daily  Aldosterone antagonist: Spironolactone 25 mg daily  SGLT2 inhibitor: Cost prohibitive for patient  Diuretic: Examines euvolemic, reports weight loss since home from the hospital and attributes this to dietary changes and lack of alcohol consumption.    Patient denies chest pain, chest pressure, lightheadedness, dizziness.  Does endorse some shortness of breath with doing housework.  Patient is to follow-up with cardiac rehab, I suggested this might help patient increase his endurance and confidence with exercise.  Patient reports difficulty getting to cardiac rehab secondary finding a ride, our office contacted cardiac rehab and they will reach out to him to see if they can be assistance in scheduling a ride.  Patient also unsure if  he will attend cardiac rehab questioning whether or not it will provide any value to him, again I educated patient on value of cardiac rehab and encouraged him to attend.    Patient reports continuous feeling as though he is just smoked marijuana despite not smoking any.  I reviewed the vascular study of his carotids which showed less than 50% stenosis bilaterally no significant subclavian artery disease.  Patient alert and oriented x 3, appears at his baseline per previous interactions.     3.)  Tobacco abuse: Patient quit smoking during his hospitalization in October.  He uses nicotine lozenges approximately 2 lozenges daily for cravings.    4.)  Hypercholesteremia: Lipid profile from 10/11/2024: Total cholesterol 144,  LDL of 89. Will recheck a lipid profile prior to his next visit with goal LDL of less than 70    5.) Hypertension: Patient's blood pressure acceptable at this visit, continue home above medications as ordered.    Interval History:   Humphrey Kiser is a 64 y.o. male with a past medical history significant for HFrEF, tobacco use disorder, stroke, depression, hypertension here for a follow-up after PCI and stenting.    Patient presented to the hospital in October with worsening shortness of breath, with pleural effusions and pulmonary edema noted on CT scan.  An echocardiogram done at that time showed an ejection fraction of 20%, to which a nuclear stress test followed for an ischemic workup.  The nuclear stress was found to be positive leading to a cardiac CTA which showed multivessel disease.  Patient underwent cardiac catheterization on 12/5/2024 with drug-eluting stents placed to the circumflex and OM lesions.    Since  since patient has been home from his cardiac catheterization he states he has been feeling in his brain like he has just smoked marijuana constantly.  Otherwise patient denies any chest pain, chest pressure, dizziness or lightheadedness.  Patient states that he does have  shortness of breath when climbing stairs to do housework but it is relieved with rest.    Medical Problems       Problem List       Congestive heart failure (CHF) (HCC)    Wt Readings from Last 3 Encounters:   25 74.8 kg (165 lb)   24 79.1 kg (174 lb 4.8 oz)   10/30/24 75.3 kg (166 lb)       Mixed hyperlipidemia    History of CVA (cerebrovascular accident)    Current episode of major depressive disorder without prior episode    Volume overload    Tobacco abuse    Elevated troponin    QT prolongation    Elevated blood pressure reading in office without diagnosis of hypertension    Coronary artery disease involving native coronary artery        Past Medical History:   Diagnosis Date    Colon polyp     Hyperlipidemia     Stroke (HCC)     left sided numbness residual     Social History     Socioeconomic History    Marital status: Single     Spouse name: Not on file    Number of children: Not on file    Years of education: Not on file    Highest education level: Not on file   Occupational History    Not on file   Tobacco Use    Smoking status: Every Day     Current packs/day: 0.00     Types: Pipe, Cigarettes     Last attempt to quit: 10/29/2005     Years since quittin.2    Smokeless tobacco: Never    Tobacco comments:     patient is currently using pipe   Vaping Use    Vaping status: Never Used   Substance and Sexual Activity    Alcohol use: Yes     Alcohol/week: 14.0 standard drinks of alcohol     Types: 14 Glasses of wine per week    Drug use: Yes     Frequency: 1.0 times per week     Types: Marijuana    Sexual activity: Yes     Partners: Female   Other Topics Concern    Not on file   Social History Narrative    Not on file     Social Drivers of Health     Financial Resource Strain: Not on file   Food Insecurity: No Food Insecurity (10/14/2024)    Nursing - Inadequate Food Risk Classification     Worried About Running Out of Food in the Last Year: Never true     Ran Out of Food in the Last Year: Never  true     Ran Out of Food in the Last Year: Not on file   Transportation Needs: No Transportation Needs (10/14/2024)    PRAPARE - Transportation     Lack of Transportation (Medical): No     Lack of Transportation (Non-Medical): No   Physical Activity: Not on file   Stress: Not on file   Social Connections: Not on file   Intimate Partner Violence: Not on file   Housing Stability: Low Risk  (10/14/2024)    Housing Stability Vital Sign     Unable to Pay for Housing in the Last Year: No     Number of Times Moved in the Last Year: 0     Homeless in the Last Year: No      Family History   Problem Relation Age of Onset    Arthritis Mother     Alcohol abuse Father     Mental illness Father     Alcohol abuse Sister     Alcohol abuse Brother      Past Surgical History:   Procedure Laterality Date    CARDIAC CATHETERIZATION Left 12/5/2024    Procedure: Cardiac Left Heart Cath;  Surgeon: Dahiana Reed DO;  Location: [facility];  Service: Cardiology    CARDIAC CATHETERIZATION N/A 12/5/2024    Procedure: Cardiac PCI;  Surgeon: Dahiana Reed DO;  Location: [facility];  Service: Cardiology    COLONOSCOPY      HERNIA REPAIR Left     inguinal       Current Outpatient Medications:     Armodafinil 250 MG tablet, Take 250 mg by mouth as needed, Disp: , Rfl:     aspirin 81 mg EC tablet, Take 1 tablet (81 mg total) by mouth daily, Disp: , Rfl:     atorvastatin (LIPITOR) 40 mg tablet, Take 1 tablet (40 mg total) by mouth daily with dinner, Disp: 90 tablet, Rfl: 1    clopidogrel (PLAVIX) 75 mg tablet, Take 1 tablet (75 mg total) by mouth daily, Disp: 30 tablet, Rfl: 11    lisinopril (ZESTRIL) 10 mg tablet, Take 1 tablet (10 mg total) by mouth daily, Disp: 90 tablet, Rfl: 1    metoprolol succinate (TOPROL-XL) 25 mg 24 hr tablet, Take 1 tablet (25 mg total) by mouth daily, Disp: 90 tablet, Rfl: 1    spironolactone (ALDACTONE) 25 mg tablet, Take 1 tablet (25 mg total) by mouth daily, Disp: 90 tablet, Rfl:  "1  Allergies   Allergen Reactions    Penicillins Hives       Labs:     Chemistry        Component Value Date/Time    K 4.2 12/06/2024 0538     12/06/2024 0538    CO2 23 12/06/2024 0538    BUN 21 12/06/2024 0538    CREATININE 0.97 12/06/2024 0538        Component Value Date/Time    CALCIUM 9.3 12/06/2024 0538    ALKPHOS 92 11/19/2024 0856    AST 15 11/19/2024 0856    ALT 12 11/19/2024 0856            No results found for: \"CHOL\"  Lab Results   Component Value Date    HDL 34 (L) 10/11/2024     Lab Results   Component Value Date    LDLCALC 89 10/11/2024     Lab Results   Component Value Date    TRIG 107 10/11/2024     No results found for: \"CHOLHDL\"    Imaging: No results found.    Review of Systems   Constitutional: Positive for malaise/fatigue. Negative for weight loss.   Cardiovascular:  Positive for dyspnea on exertion. Negative for chest pain, irregular heartbeat, leg swelling, near-syncope, orthopnea, palpitations, paroxysmal nocturnal dyspnea and syncope.   Respiratory:  Negative for shortness of breath and sleep disturbances due to breathing.    Musculoskeletal:  Positive for myalgias and neck pain.   Neurological:  Negative for dizziness, light-headedness and weakness.       Vitals:    01/07/25 1330   BP: 126/72   Pulse: 75     Vitals:    01/07/25 1330   Weight: 74.8 kg (165 lb)     Height: 5' 9\" (175.3 cm)   Body mass index is 24.37 kg/m².    Physical Exam:  Physical Exam  Constitutional:       Appearance: Normal appearance.   HENT:      Mouth/Throat:      Mouth: Mucous membranes are moist.   Neck:      Vascular: No carotid bruit.   Cardiovascular:      Rate and Rhythm: Normal rate and regular rhythm.      Pulses: Normal pulses.      Heart sounds: Normal heart sounds. No murmur heard.     No friction rub.   Pulmonary:      Effort: Pulmonary effort is normal.      Breath sounds: Normal breath sounds.   Abdominal:      General: Bowel sounds are normal.      Palpations: Abdomen is soft.   Musculoskeletal: "      Right lower leg: No edema.      Left lower leg: No edema.   Skin:     General: Skin is warm.      Capillary Refill: Capillary refill takes less than 2 seconds.   Neurological:      General: No focal deficit present.      Mental Status: He is alert and oriented to person, place, and time.

## 2025-02-27 DIAGNOSIS — I50.9 CHF (CONGESTIVE HEART FAILURE) (HCC): ICD-10-CM

## 2025-02-27 RX ORDER — METOPROLOL SUCCINATE 25 MG/1
25 TABLET, EXTENDED RELEASE ORAL DAILY
Qty: 90 TABLET | Refills: 1 | Status: SHIPPED | OUTPATIENT
Start: 2025-02-27

## 2025-02-27 RX ORDER — LISINOPRIL 10 MG/1
10 TABLET ORAL DAILY
Qty: 90 TABLET | Refills: 1 | Status: SHIPPED | OUTPATIENT
Start: 2025-02-27

## 2025-02-28 RX ORDER — SPIRONOLACTONE 25 MG/1
25 TABLET ORAL DAILY
Qty: 90 TABLET | Refills: 1 | Status: SHIPPED | OUTPATIENT
Start: 2025-02-28

## 2025-02-28 RX ORDER — LISINOPRIL 10 MG/1
10 TABLET ORAL DAILY
Qty: 90 TABLET | Refills: 0 | OUTPATIENT
Start: 2025-02-28

## 2025-02-28 RX ORDER — METOPROLOL SUCCINATE 25 MG/1
25 TABLET, EXTENDED RELEASE ORAL DAILY
Qty: 90 TABLET | Refills: 0 | OUTPATIENT
Start: 2025-02-28

## 2025-03-28 DIAGNOSIS — I50.9 CHF (CONGESTIVE HEART FAILURE) (HCC): ICD-10-CM

## 2025-03-28 RX ORDER — ATORVASTATIN CALCIUM 40 MG/1
TABLET, FILM COATED ORAL
Qty: 90 TABLET | Refills: 1 | Status: SHIPPED | OUTPATIENT
Start: 2025-03-28

## 2025-04-17 ENCOUNTER — HOSPITAL ENCOUNTER (OUTPATIENT)
Dept: NON INVASIVE DIAGNOSTICS | Age: 65
Discharge: HOME | End: 2025-04-17
Payer: COMMERCIAL

## 2025-04-17 ENCOUNTER — APPOINTMENT (OUTPATIENT)
Dept: LAB | Facility: HOSPITAL | Age: 65
End: 2025-04-17
Payer: COMMERCIAL

## 2025-04-17 VITALS
HEIGHT: 69 IN | SYSTOLIC BLOOD PRESSURE: 126 MMHG | HEART RATE: 62 BPM | WEIGHT: 165 LBS | BODY MASS INDEX: 24.44 KG/M2 | DIASTOLIC BLOOD PRESSURE: 72 MMHG

## 2025-04-17 DIAGNOSIS — I50.20 HEART FAILURE WITH REDUCED EJECTION FRACTION (HCC): ICD-10-CM

## 2025-04-17 DIAGNOSIS — E78.5 HYPERLIPIDEMIA, UNSPECIFIED HYPERLIPIDEMIA TYPE: ICD-10-CM

## 2025-04-17 LAB
CHOLEST SERPL-MCNC: 144 MG/DL (ref ?–200)
HDLC SERPL-MCNC: 51 MG/DL
LDLC SERPL CALC-MCNC: 79 MG/DL (ref 0–100)
NONHDLC SERPL-MCNC: 93 MG/DL
TRIGL SERPL-MCNC: 71 MG/DL (ref ?–150)

## 2025-04-17 PROCEDURE — 36415 COLL VENOUS BLD VENIPUNCTURE: CPT

## 2025-04-17 PROCEDURE — 93306 TTE W/DOPPLER COMPLETE: CPT | Performed by: INTERNAL MEDICINE

## 2025-04-17 PROCEDURE — 93356 MYOCRD STRAIN IMG SPCKL TRCK: CPT | Performed by: INTERNAL MEDICINE

## 2025-04-17 PROCEDURE — 93356 MYOCRD STRAIN IMG SPCKL TRCK: CPT

## 2025-04-17 PROCEDURE — 93306 TTE W/DOPPLER COMPLETE: CPT

## 2025-04-17 PROCEDURE — 80061 LIPID PANEL: CPT

## 2025-04-18 LAB
AORTIC ROOT: 3.6 CM
AORTIC VALVE MEAN VELOCITY: 10.7 M/S
ASCENDING AORTA: 3.8 CM
AV AREA BY CONTINUOUS VTI: 2.2 CM2
AV AREA PEAK VELOCITY: 2 CM2
AV LVOT MEAN GRADIENT: 2 MMHG
AV LVOT PEAK GRADIENT: 3 MMHG
AV MEAN PRESS GRAD SYS DOP V1V2: 5 MMHG
AV ORIFICE AREA US: 2.17 CM2
AV PEAK GRADIENT: 11 MMHG
AV VELOCITY RATIO: 0.57
AV VMAX SYS DOP: 1.63 M/S
BSA FOR ECHO PROCEDURE: 1.9 M2
DOP CALC AO VTI: 33 CM
DOP CALC LVOT AREA: 3.8 CM2
DOP CALC LVOT CARDIAC INDEX: 2.25 L/MIN/M2
DOP CALC LVOT CARDIAC OUTPUT: 4.27 L/MIN
DOP CALC LVOT DIAMETER: 2.2 CM
DOP CALC LVOT PEAK VEL VTI: 18.87 CM
DOP CALC LVOT PEAK VEL: 0.85 M/S
DOP CALC LVOT STROKE INDEX: 37.4 ML/M2
DOP CALC LVOT STROKE VOLUME: 71.69
DOP CALC MV VTI: 24.27 CM
E WAVE DECELERATION TIME: 176 MS
E/A RATIO: 0.63
FRACTIONAL SHORTENING: 25 (ref 28–44)
GLOBAL LONGITUIDAL STRAIN: -13 %
INTERVENTRICULAR SEPTUM IN DIASTOLE (PARASTERNAL SHORT AXIS VIEW): 1.2 CM
INTERVENTRICULAR SEPTUM: 1.2 CM (ref 0.6–1.1)
LAAS-AP2: 18.3 CM2
LAAS-AP4: 17.6 CM2
LEFT ATRIUM SIZE: 3.4 CM
LEFT ATRIUM VOLUME (MOD BIPLANE): 52 ML
LEFT ATRIUM VOLUME INDEX (MOD BIPLANE): 27.4 ML/M2
LEFT INTERNAL DIMENSION IN SYSTOLE: 3.3 CM (ref 2.1–4)
LEFT VENTRICLE DIASTOLIC VOLUME (MOD BIPLANE): 154 ML
LEFT VENTRICLE DIASTOLIC VOLUME INDEX (MOD BIPLANE): 81.1 ML/M2
LEFT VENTRICLE SYSTOLIC VOLUME (MOD BIPLANE): 65 ML
LEFT VENTRICLE SYSTOLIC VOLUME INDEX (MOD BIPLANE): 34.2 ML/M2
LEFT VENTRICULAR INTERNAL DIMENSION IN DIASTOLE: 4.4 CM (ref 3.5–6)
LEFT VENTRICULAR POSTERIOR WALL IN END DIASTOLE: 1.3 CM
LEFT VENTRICULAR STROKE VOLUME: 45 ML
LV EF BIPLANE MOD: 58 %
LV EF US.2D.A4C+ESTIMATED: 52 %
LVSV (TEICH): 45 ML
MV E'TISSUE VEL-LAT: 7 CM/S
MV E'TISSUE VEL-SEP: 7 CM/S
MV MEAN GRADIENT: 1 MMHG
MV PEAK A VEL: 1.03 M/S
MV PEAK E VEL: 65 CM/S
MV PEAK GRADIENT: 3 MMHG
MV STENOSIS PRESSURE HALF TIME: 51 MS
MV VALVE AREA BY CONTINUITY EQUATION: 2.95 CM2
MV VALVE AREA P 1/2 METHOD: 4.31
RA PRESSURE ESTIMATED: 3 MMHG
RIGHT ATRIAL 2D VOLUME: 44 ML
RIGHT ATRIUM AREA SYSTOLE A4C: 16.7 CM2
RIGHT VENTRICLE ID DIMENSION: 4.2 CM
SL CV LEFT ATRIUM LENGTH A2C: 5.2 CM
SL CV LV EF: 58
SL CV PED ECHO LEFT VENTRICLE DIASTOLIC VOLUME (MOD BIPLANE) 2D: 89 ML
SL CV PED ECHO LEFT VENTRICLE SYSTOLIC VOLUME (MOD BIPLANE) 2D: 44 ML
TRANSVERSE AORTIC ARCH: 3.12 CM
TRICUSPID ANNULAR PLANE SYSTOLIC EXCURSION: 2.1 CM

## 2025-04-23 ENCOUNTER — OFFICE VISIT (OUTPATIENT)
Dept: CARDIOLOGY CLINIC | Facility: CLINIC | Age: 65
End: 2025-04-23
Payer: COMMERCIAL

## 2025-04-23 VITALS
HEART RATE: 71 BPM | OXYGEN SATURATION: 98 % | WEIGHT: 202 LBS | HEIGHT: 69 IN | DIASTOLIC BLOOD PRESSURE: 78 MMHG | BODY MASS INDEX: 29.92 KG/M2 | SYSTOLIC BLOOD PRESSURE: 136 MMHG

## 2025-04-23 DIAGNOSIS — I25.119 CORONARY ARTERY DISEASE INVOLVING NATIVE CORONARY ARTERY OF NATIVE HEART WITH ANGINA PECTORIS (HCC): ICD-10-CM

## 2025-04-23 DIAGNOSIS — I42.0 DILATED CARDIOMYOPATHY (HCC): ICD-10-CM

## 2025-04-23 DIAGNOSIS — I50.32 HEART FAILURE WITH IMPROVED EJECTION FRACTION (HFIMPEF) (HCC): Primary | ICD-10-CM

## 2025-04-23 DIAGNOSIS — E78.2 MIXED HYPERLIPIDEMIA: ICD-10-CM

## 2025-04-23 DIAGNOSIS — Z95.5 STATUS POST INSERTION OF DRUG ELUTING CORONARY ARTERY STENT: ICD-10-CM

## 2025-04-23 PROBLEM — E87.70 VOLUME OVERLOAD: Status: RESOLVED | Noted: 2024-10-11 | Resolved: 2025-04-23

## 2025-04-23 PROCEDURE — 99214 OFFICE O/P EST MOD 30 MIN: CPT | Performed by: INTERNAL MEDICINE

## 2025-04-23 RX ORDER — PREDNISONE 10 MG/1
TABLET ORAL
COMMUNITY
Start: 2025-04-23

## 2025-04-23 NOTE — PATIENT INSTRUCTIONS
"Patient Education     Low-sodium diet   The Basics   Written by the doctors and editors at [facility]   What is sodium? -- This is the main ingredient in table salt. It is also found in lots of foods. The body needs a very small amount of sodium to work normally, but most people eat much more sodium than their body needs.  Who should eat less sodium? -- Nearly everyone eats too much sodium. The average American takes in 3400 milligrams of sodium each day. Experts say that most people should have no more than 2300 milligrams a day.  Some people with certain health conditions should follow a low-sodium diet. Ask your doctor how much sodium you should have.  Why should I eat less sodium? -- Reducing the amount of sodium you eat can have lots of health benefits:   It can lower your blood pressure. This means that it can help lower your risk of stroke, heart attack, kidney damage, and lots of other health problems.   It can reduce the amount of fluid in your body, which means that your heart doesn't have to work as hard.   It can keep the kidneys from having to work too hard. This is especially important in people who have kidney disease.   It can reduce swelling in the ankles and belly, which can be uncomfortable and make it hard to move.   It can lower the chances of forming kidney stones.   It can help keep your bones strong.  Which foods have the most sodium? -- Processed foods have the most sodium. These foods usually come in cans, boxes, jars, and bags. They tend to have a lot of sodium even if they don't taste salty. In fact, many sweet foods have a lot of sodium in them. The only way to know for sure how much sodium is in a food is to check the label (figure 1).  Here are some examples of foods that often have too much sodium:   Canned soups   Rice and noodle mixes   Sauces, dressings, and condiments (such as ketchup and mustard)   Pre-made frozen meals (also called \"TV dinners\")   Deli meats, hot dogs, and " "cheeses   Smoked, cured, or pickled foods   Salted snack foods and nuts   Restaurant meals  What should I do to reduce the amount of sodium in my diet? -- Many people think that eating a low-sodium diet just means not adding salt to their food. But this is not true. Not adding salt at the table or when cooking will help a little. But almost all of the sodium you eat is already in the food you buy at the grocery store or at restaurants (figure 2).  Here are some tips to help you eat less sodium:   Avoid processed foods when possible. This is the most important thing you can do to eat less sodium. Processed foods include most foods that are sold in cans, boxes, jars, and bags.   Instead of buying pre-made, processed foods, buy fresh or fresh-frozen fruits and vegetables. (\"Fresh-frozen\" means that the food is frozen without anything added to it.)   Buy meats, fish, chicken, and turkey that are fresh instead of canned or sold at the deli counter. (Meats sold at the deli counter are high in sodium.)   Try to eat at restaurants less often.   When possible, try to make meals from scratch at home using fresh ingredients.   If you do buy canned or packaged foods, choose ones that are labeled \"sodium free\" or \"very low sodium\" (table 1). Or choose foods that have less than 400 milligrams of sodium in each serving. The amount of sodium in each serving appears on the nutrition label (figure 1).  The table has some examples of foods to avoid and foods to choose instead (table 2).  Whatever changes you make, make them slowly. Choose 1 thing to do differently, and do that for a while. If you can keep doing that change easily, add another change. For instance, if you usually eat green beans from a can, try buying fresh or fresh-frozen green beans and cooking them at home without adding salt. If that works for you, keep doing it. Then, choose another thing to change.  If you try making a change and it doesn't work right away, don't " "give up. See if you can reduce sodium in other ways. The important thing is to take small steps and to keep doing the changes that work for you.  Can I still eat out at restaurants sometimes? -- One of best ways to limit your sodium is to only eat out at restaurants every once in a while. When you do eat out, try to choose places that offer healthier choices and fresh ingredients.  No matter where you eat, when choosing your food:   Ask your  if your meal can be made without salt.   Avoid foods that come with sauces or dips.   Choose plain grilled meats or fish and steamed vegetables.   Ask for oil and vinegar for your salad, rather than dressing.   If a meal you really want has more sodium than you should have, consider saving half of it to eat another day.  What if food just does not taste as good without sodium? -- Starting a low-sodium diet can be hard. The good news is that your taste buds can get used to having less sodium. But you have to give them some time to adjust.  It can also help to try other ways to add flavor to your foods. Try things like herbs, spices, lemon juice, and vinegar.  What about salt substitutes? -- Flavoring your food with a salt substitute is a good way to reduce how much salt you eat. But check with your doctor or nurse before trying this. Some salt substitutes can be dangerous if you have certain health problems or take certain medicines.  Do medicines have sodium? -- Yes, some medicines contain sodium. If you are buying medicines you can get without a prescription, look to see how much sodium they have. Avoid products that have \"sodium carbonate\" or \"sodium bicarbonate\" unless your doctor prescribes them. (Sodium bicarbonate is baking soda.)  All topics are updated as new evidence becomes available and our peer review process is complete.  This topic retrieved from [employer] on: Mar 22, 2024.  Topic 26763 Version 14.0  Release: 32.2.4 - C32.80  © 2024 Up[employer], Inc[employer] and/or its " "affiliates. All rights reserved.  figure 1: Food labels can be tricky     To figure out how much sodium you are eating, check the label to find out how much sodium is in 1 serving. If you are having more than 1 serving, multiply that amount by the number of servings you plan to eat. For instance, if you are going to eat this whole can of soup, you should multiply 850 by 2. That means that you will be having 1700 milligrams of sodium. That's more sodium than many people are supposed to have in 1 day.  Graphic 22419 Version 8.0  figure 2: Sources of sodium in your diet     Graphic 22169 Version 2.0  table 1: A guide to common nutrient claims and what they mean  Salt/sodium-free  Less than 5 mg of sodium per serving   Very low sodium  35 mg or less of sodium per serving   Low sodium  140 mg or less of sodium per serving   Reduced sodium  At least 25% less sodium than the regular product   Light or lite in sodium  At least 50% less sodium than the regular product   No salt added or unsalted  No salt is added during processing, but these products may not be salt/sodium-free unless stated   mg: milligram; %: percent.  Graphic 02590 Version 7.0  table 2: Ways to cut down on salt (sodium)  Avoid these foods  Try these foods instead    Cured and smoked foods like coleman, sausage, smoked fish and meats, hot dogs, ham, lunch meats, corned beef, and pickles Fresh turkey, chicken, and lean beef   Canned fish (tuna, sardines) Unsalted tuna or sardines   Canned meats Fresh unprocessed meats, vegetable protein, and fish  Frozen and canned meats, vegetable protein, and fish that are labeled \"low-sodium\"   Salted pretzels, crackers, potato chips, tortilla chips, and nuts Low-sodium and unsalted versions of these foods   Most cheeses Low-sodium cheeses (check label for actual sodium content)   Sauces (tomato and cream, etc), tomato juices Low-sodium versions of these foods, such as low-sodium tomato juice   Processed, instant, and " "convenience foods like frozen dinners, packaged meals, canned soups, and boxed pasta blends Cook and freeze your own low-sodium meals, soups, and broths    If you do need to use convenience or processed foods, read the labels. Choose items with 140 to 200 mg of sodium per serving.    For an entire convenience meal (frozen dinner), try to find options with less than 500 to 600 mg of sodium.    If you used canned foods, look for those labeled \"sodium-free.\" Or you can rinse the canned food under water to lower the sodium content.   Fast foods and foods prepared at restaurants (unless without cheese, sauces, or added salt) Fresh foods and foods with sauces on the side  Request that food be prepared without cheese or added salt   Graphic 44539 Version 10.0  Consumer Information Use and Disclaimer   Disclaimer: This generalized information is a limited summary of diagnosis, treatment, and/or medication information. It is not meant to be comprehensive and should be used as a tool to help the user understand and/or assess potential diagnostic and treatment options. It does NOT include all information about conditions, treatments, medications, side effects, or risks that may apply to a specific patient. It is not intended to be medical advice or a substitute for the medical advice, diagnosis, or treatment of a health care provider based on the health care provider's examination and assessment of a patient's specific and unique circumstances. Patients must speak with a health care provider for complete information about their health, medical questions, and treatment options, including any risks or benefits regarding use of medications. This information does not endorse any treatments or medications as safe, effective, or approved for treating a specific patient. UpToDate, Inc. and its affiliates disclaim any warranty or liability relating to this information or the use thereof.The use of this information is governed by the " Terms of Use, available at https://www.[employer].com/en/know/clinical-effectiveness-terms. 2024© [employer] and its affiliates and/or licensors. All rights reserved.  Copyright   © 2024 [employer] and/or its affiliates. All rights reserved.

## 2025-04-23 NOTE — PROGRESS NOTES
Cardiology Follow Up    Humphrey Kiser  1960  [id number]  [facility]  [facility] [place]  [facility] [id number] [phone]    1. Heart failure with improved ejection fraction (HFimpEF) ([facility])        2. Coronary artery disease involving native coronary artery of native heart with angina pectoris ([facility])        3. Mixed hyperlipidemia            Discussion/summary:    Chronic heart failure with an improved ejection fraction - Humphrey's ejection fraction has come back to normal on echocardiogram from last week.  He still has exertional shortness of breath, but he has overall clinically improved since his hospitalization.  He was offered cardiac rehabilitation but he was not able to get a ride there.  He will continue to remain active at home.  He has not needed loop diuretic therapy.  Low-sodium diet recommended.  We will see him back in 6 months.    Dilated cardiomyopathy - His EF was severely reduced to 20% during his hospitalization.  This was a combined cardiomyopathy, with the vast majority of a drop in his ejection fraction being nonischemic.  His ejection fraction is now recovered and is now 55 to 60%.  His goal-directed medical therapy includes Toprol-XL, lisinopril and spironolactone.    CAD -after being found to have a drop in his ejection fraction he went through a stress nuclear study that showed inferior ischemia.  He then went through cardiac catheterization that did demonstrate a significant stenosis of his left circumflex and first OM 1.  He had drug-eluting stent placement to both of these.  He remains on dual antiplatelet therapy.  He is also on beta-blocker and statin.  He is without symptoms of angina.    Hyperlipidemia - He will remain on atorvastatin 40 mg daily.  We will continue to follow blood work closely.  His most recent LDL is 79.    Interval History:     Mr. Kiser comes in for  follow-up given his history of CHF, CAD and a dilated cardiomyopathy.  I met Humphrey during a hospitalization in October in which he presented in acute CHF.  He was found to have a severely reduced ejection fraction of 20% that appeared nonischemic.  He received a few days of IV diuretics but was not discharged on this as his creatinine sheree.  He was placed on goal-directed medical therapy which included Toprol-XL, lisinopril and spironolactone.    In follow-up he was having significant fatigue and therefore metoprolol was decreased down to 25 mg daily.  He still did not require loop diuretic therapy.  He had been offered a LifeVest but declined.  He then went through a stress nuclear study that showed inferior ischemia.  He had a cardiac catheterization that showed a significant left circumflex and OM stenosis, in which both of these were stented with a drug-eluting stent.  His cardiomyopathy was certainly out of proportion to just this level of CAD.  Repeat echocardiogram performed last week showed improvement of his ejection fraction back to 55 to 60%.  He does have at most mild aortic stenosis.    Clinically Humphrey has been stable.  He still battles fatigue and exertional shortness of breath at times but he is active without limitations.  He does take care of his yard and tries to steadily increase his activity.  He shows no signs of volume overload.  He was offered cardiac rehabilitation but declined as he was not able to get a ride.  Denies chest pain or any symptoms of angina.  No palpitations, lightheadedness or syncope.    Patient Active Problem List   Diagnosis    Mixed hyperlipidemia    History of CVA (cerebrovascular accident)    Current episode of major depressive disorder without prior episode    Volume overload    Tobacco abuse    Elevated troponin    QT prolongation    Elevated blood pressure reading in office without diagnosis of hypertension    Heart failure with improved ejection fraction  (HFimpEF) (HCC)    Coronary artery disease involving native coronary artery     Past Medical History:   Diagnosis Date    Colon polyp     Hyperlipidemia     Stroke (HCC)     left sided numbness residual     Social History     Socioeconomic History    Marital status: Single     Spouse name: Not on file    Number of children: Not on file    Years of education: Not on file    Highest education level: Not on file   Occupational History    Not on file   Tobacco Use    Smoking status: Every Day     Current packs/day: 0.00     Types: Pipe, Cigarettes     Last attempt to quit: 10/29/2005     Years since quittin.4    Smokeless tobacco: Never    Tobacco comments:     patient is currently using pipe   Vaping Use    Vaping status: Never Used   Substance and Sexual Activity    Alcohol use: Yes     Alcohol/week: 14.0 standard drinks of alcohol     Types: 14 Glasses of wine per week    Drug use: Yes     Frequency: 1.0 times per week     Types: Marijuana    Sexual activity: Yes     Partners: Female   Other Topics Concern    Not on file   Social History Narrative    Not on file     Social Drivers of Health     Financial Resource Strain: Not on file   Food Insecurity: No Food Insecurity (10/14/2024)    Nursing - Inadequate Food Risk Classification     Worried About Running Out of Food in the Last Year: Never true     Ran Out of Food in the Last Year: Never true     Ran Out of Food in the Last Year: Not on file   Transportation Needs: No Transportation Needs (10/14/2024)    PRAPARE - Transportation     Lack of Transportation (Medical): No     Lack of Transportation (Non-Medical): No   Physical Activity: Not on file   Stress: Not on file   Social Connections: Not on file   Intimate Partner Violence: Not on file   Housing Stability: Low Risk  (10/14/2024)    Housing Stability Vital Sign     Unable to Pay for Housing in the Last Year: No     Number of Times Moved in the Last Year: 0     Homeless in the Last Year: No      Family  History   Problem Relation Age of Onset    Arthritis Mother     Alcohol abuse Father     Mental illness Father     Alcohol abuse Sister     Alcohol abuse Brother      Past Surgical History:   Procedure Laterality Date    CARDIAC CATHETERIZATION Left 12/5/2024    Procedure: Cardiac Left Heart Cath;  Surgeon: Dahiana Reed DO;  Location: [facility];  Service: Cardiology    CARDIAC CATHETERIZATION N/A 12/5/2024    Procedure: Cardiac PCI;  Surgeon: Dahiana eRed DO;  Location: [facility];  Service: Cardiology    COLONOSCOPY      HERNIA REPAIR Left     inguinal       Current Outpatient Medications:     Armodafinil 250 MG tablet, Take 250 mg by mouth as needed, Disp: , Rfl:     aspirin 81 mg EC tablet, Take 1 tablet (81 mg total) by mouth daily, Disp: , Rfl:     atorvastatin (LIPITOR) 40 mg tablet, TAKE 1 TABLET BY MOUTH ONCE DAILY WITH SUPPER, Disp: 90 tablet, Rfl: 1    clopidogrel (PLAVIX) 75 mg tablet, Take 1 tablet (75 mg total) by mouth daily, Disp: 30 tablet, Rfl: 11    lisinopril (ZESTRIL) 10 mg tablet, Take 1 tablet by mouth once daily, Disp: 90 tablet, Rfl: 1    metoprolol succinate (TOPROL-XL) 25 mg 24 hr tablet, Take 1 tablet by mouth once daily, Disp: 90 tablet, Rfl: 1    predniSONE 10 mg tablet, , Disp: , Rfl:     spironolactone (ALDACTONE) 25 mg tablet, Take 1 tablet (25 mg total) by mouth daily, Disp: 90 tablet, Rfl: 1  Allergies   Allergen Reactions    Penicillins Hives       Labs:  Lab Results   Component Value Date    K 4.2 12/06/2024     12/06/2024    CO2 23 12/06/2024    BUN 21 12/06/2024    CREATININE 0.97 12/06/2024    CALCIUM 9.3 12/06/2024     Lab Results   Component Value Date    WBC 11.96 (H) 12/06/2024    HGB 14.2 12/06/2024    HCT 42.8 12/06/2024    MCV 86 12/06/2024     12/06/2024     Lab Results   Component Value Date    TRIG 71 04/17/2025    HDL 51 04/17/2025     Imaging:     Echo complete w/ contrast if indicated  Result Date: 4/18/2025  Narrative:    Left Ventricle: Left ventricular cavity size is normal. Wall thickness is increased. There is concentric remodeling. The left ventricular ejection fraction is 58% by biplane measurement. Systolic function is normal. Global longitudinal strain is reduced at -13%. Wall motion is normal. Diastolic function is mildly abnormal, consistent with grade I (abnormal) relaxation.   Right Ventricle: Right ventricular cavity size is top normal. Systolic function is normal.   Left Atrium: The atrium is normal in size.   Right Atrium: The atrium is normal in size.   Aorta: The aortic root is normal in size. The ascending aorta is mildly dilated. The ascending aorta is 3.8 cm.   Prior TTE study available for comparison. Prior study date: 10/14/2024. Changes noted when compared to prior study. Changes include: LVEF has recovered, now within normal limits.      CARDIAC CATH (12/5/2024):  Diagnostic  Dominance: Right    Left Main   The vessel was visualized by angiography and is large. The vessel exhibits minimal luminal irregularities.      Left Anterior Descending   The vessel was visualized by angiography and is large. There is mild diffuse disease throughout the vessel.      Left Circumflex   The vessel was visualized by angiography and is moderate in size. There is severe diffuse disease throughout the vessel.   Prox Cx to Mid Cx lesion is 80% stenosed. Culprit lesion. Culprit for clinical presentation.ARIADNA flow is 3. Ultrasound (IVUS) was performed on the Prox Cx to Mid Cx.      First Obtuse Marginal Branch   The vessel is moderate in size and was visualized. There is severe diffuse disease throughout the vessel.   1st Mrg lesion is 70% stenosed. Culprit lesion. Culprit for clinical presentation.ARIADNA flow is 3.      Right Coronary Artery   The vessel was visualized by angiography and is moderate in size. There is moderate diffuse disease throughout the vessel.      Intervention        Prox Cx to Mid Cx lesion   Angioplasty    Angioplasty was performed using a CATH BAL PTCA OTW TREK 2.5 X 15MM balloon. Maximum pressure: 10 deep. Inflation time: 8 sec. Time obtained: 13:19 EST. First reinflation; Max pressure: 8 deep. Inflation time 8 sec. Time obtained: 13:19 EST.   Supplies used: INTRO GLIDESHEATH 6FR 10CM W/.021 WIRE SPRING/STRAIGHT; GUIDEWIRE [employer] HYDROCOAT 190CM; CATH GUIDE LAUNCHER 6FR EBU 3.5; CATH BAL PTCA OTW TREK 2.5 X 15MM   Stent   Drug-eluting stent was successfully placed .The stent used was a STENT XIENCE SKYPOINT 3.25 X 18MM. Stent was deployed by way of balloon expansion. Maximum pressure: 12 deep. Inflation time: 15 sec.   Supplies used: GUIDEWIRE [employer] HYDROCOAT 190CM; CATH GUIDE LAUNCHER 6FR EBU 3.5; STENT XIENCE SKYPOINT 3.25 X 18MM   Angioplasty   Post-stent angioplasty was performed using a STENT XIENCE SKYPOINT 3.25 X 18MM balloon. Maximum pressure: 4 deep. Inflation time: 5 sec. Time obtained: 13:25 EST. First reinflation; Max pressure: 12 deep. Inflation time 5 sec. Time obtained: 13:25 EST.   Supplies used: GUIDEWIRE [employer] HYDROCOAT 190CM; CATH GUIDE LAUNCHER 6FR EBU 3.5; STENT XIENCE SKYPOINT 3.25 X 18MM   Angioplasty   Angioplasty was performed. Maximum pressure: 12 deep. Inflation time: 15 sec. Time obtained: 13:48 EST.   Supplies used: GUIDEWIRE [employer] HYDROCOAT 190CM; CATH GUIDE LAUNCHER 6FR EBU 3.5   Angioplasty   Pre-stent and kissing balloon dilatation was performed. A semi-compliant and CATH BAL PTCA EMERGE MR 3 X 20MM balloon was used in the main branch. Maximum pressure: 10 deep. Inflation time: 15 sec. Time obtained: 13:50 EST. A compliant and CATH BAL PTCA EMERGE MR 3 X 8MM balloon was used in the side branch. Maximum pressure: 10 deep. Inflation time: 15 sec. Time obtained: 13:50 EST.   Supplies used: GUIDEWIRE ASAHI PROWATER 180CM STR TIP; CATH GUIDE LAUNCHER 6FR EBU 3.5   Angioplasty   Pre-stent angioplasty was performed using a CATH BAL PTCA EMERGE MR 3 X 20MM balloon. Maximum pressure: 17 deep. Inflation time:  28 sec. Time obtained: 13:54 EST.   Supplies used: GUIDEWIRE [employer] HYDROCOAT 190CM; CATH GUIDE LAUNCHER 6FR EBU 3.5   Angioplasty   Kissing balloon dilatation was performed In the main branch and side branch. Mid Cx and OM A CATH BAL PTCA EMERGE MR 3.5 X 20MM balloon was used in the main branch. Maximum pressure: 8 deep. Inflation time: 15 sec. Time obtained: 14:04 EST. A CATH BAL PTCA EMERGE MR 3.5 X 20MM balloon was used in the side branch. Maximum pressure: 8 deep. Inflation time: 15 sec. Time obtained: 14:04 EST.   Supplies used: GUIDEWIRE [employer] HYDROCOAT 190CM; GUIDEWIRE ASAHI PROWATER 180CM STR TIP; CATH GUIDE LAUNCHER 6FR EBU 3.5; CATH BAL PTCA EMERGE MR 3.5 X 20MM   Angioplasty   Angioplasty was performed using a CATH BAL PTCA EMERGE MR 3.5 X 20MM balloon. Maximum pressure: 8 deep. Inflation time: 8 sec. Time obtained: 14:04 EST.   Supplies used: GUIDEWIRE [employer] HYDROCOAT 190CM; CATH GUIDE LAUNCHER 6FR EBU 3.5; CATH BAL PTCA EMERGE MR 3.5 X 20MM   Post-Intervention Lesion Assessment   The intervention was successful. Post-intervention ARIADNA flow is 3. There were no complications. Ultrasound (IVUS) was performed.   There is a 0% residual stenosis post intervention.      1st Mrg lesion   Angioplasty   Angioplasty was performed. Maximum pressure: 12 deep. Inflation time: 10 sec. Time obtained: 13:27 EST. First reinflation; Max pressure: 14 deep. Inflation time 10 sec. Time obtained: 13:28 EST.   Supplies used: GUIDEWIRE ASAHI PROWATER 180CM STR TIP; CATH GUIDE LAUNCHER 6FR EBU 3.5; CATH BAL PTCA OTW TREK 2.5 X 15MM   Angioplasty   Angioplasty was performed using a CATH BAL PTCA RX TREK 3 X 8MM balloon. Maximum pressure: 12 deep. Inflation time: 10 sec. Time obtained: 13:31 EST. First reinflation; Max pressure: 12 deep. Inflation time 8 sec. Time obtained: 13:31 EST.   Supplies used: CATH GUIDE LAUNCHER 6FR EBU 3.5; CATH BAL PTCA RX TREK 3 X 8MM   Stent   Drug-eluting stent was successfully placed .The stent used was a STENT  XIENCE SKYPOINT 3.25 X 38MM. Stent was deployed by way of balloon expansion. Maximum pressure: 12 deep. Inflation time: 8 sec.   Supplies used: INTRO GLIDESHEATH 6FR 10CM W/.021 WIRE SPRING/STRAIGHT; GUIDEWIRE ASAHI PROWATER 180CM STR TIP; CATH GUIDE LAUNCHER 6FR EBU 3.5; STENT XIENCE SKYPOINT 3.25 X 38MM   Angioplasty   Post-stent angioplasty was performed using a STENT XIENCE SKYPOINT 3.25 X 38MM balloon. Maximum pressure: 12 deep. Inflation time: 8 sec. Time obtained: 13:35 EST.   Supplies used: STENT XIENCE SKYPOINT 3.25 X 38MM   Angioplasty   Angioplasty was performed. Maximum pressure: 14 deep. Inflation time: 10 sec. Time obtained: 14:07 EST.   Supplies used: GUIDEWIRE ASAHI PROWATER 180CM STR TIP; CATH GUIDE LAUNCHER 6FR EBU 3.5; CATH BAL PTCA EMERGE MR 3.5 X 20MM   Post-Intervention Lesion Assessment   The intervention was successful. Post-intervention ARIADNA flow is 3. There were no complications.   There is a 0% residual stenosis post intervention.          ECHO (10/14/2024):    Left Ventricle: Left ventricular cavity size is upper normal. Wall thickness is mildly increased. The left ventricular ejection fraction is 20%. Systolic function is severely reduced. Global longitudinal strain is reduced at -5%. There is severe global hypokinesis with regional variation. Diastolic function is moderately abnormal, consistent with grade II (pseudonormal) relaxation.    Right Ventricle: Systolic function is mildly reduced.    Left Atrium: The atrium is moderately dilated.    Aortic Valve: There is mild stenosis.    Mitral Valve: There is mild annular calcification. There is trace regurgitation.    Tricuspid Valve: There is mild regurgitation.      Review of Systems:  Review of Systems   Constitutional:  Positive for fatigue.   HENT: Negative.     Eyes: Negative.    Respiratory:  Positive for shortness of breath.    Cardiovascular: Negative.    Gastrointestinal: Negative.    Musculoskeletal: Negative.    Skin: Negative.   "  Allergic/Immunologic: Negative.    Neurological: Negative.    Hematological: Negative.    Psychiatric/Behavioral: Negative.     All other systems reviewed and are negative.    Vitals:    04/23/25 1254   BP: 136/78   BP Location: Right arm   Patient Position: Sitting   Cuff Size: Standard   Pulse: 71   SpO2: 98%   Weight: 91.6 kg (202 lb)   Height: 5' 9\" (1.753 m)     Physical Exam  Vitals and nursing note reviewed.   Constitutional:       Appearance: He is well-developed.   HENT:      Head: Normocephalic and atraumatic.   Eyes:      General: No scleral icterus.        Right eye: No discharge.         Left eye: No discharge.      Pupils: Pupils are equal, round, and reactive to light.   Neck:      Thyroid: No thyromegaly.      Vascular: No JVD.   Cardiovascular:      Rate and Rhythm: Normal rate and regular rhythm. No extrasystoles are present.     Pulses: Normal pulses. No decreased pulses.      Heart sounds: Normal heart sounds, S1 normal and S2 normal. No murmur heard.     No friction rub. No gallop.   Pulmonary:      Effort: Pulmonary effort is normal. No respiratory distress.      Breath sounds: Normal breath sounds. No wheezing or rales.   Abdominal:      General: Bowel sounds are normal. There is no distension.      Palpations: Abdomen is soft.      Tenderness: There is no abdominal tenderness.   Musculoskeletal:         General: No tenderness or deformity. Normal range of motion.      Cervical back: Normal range of motion and neck supple.   Skin:     General: Skin is warm and dry.      Findings: No rash.   Neurological:      Mental Status: He is alert and oriented to person, place, and time.      Cranial Nerves: No cranial nerve deficit.   Psychiatric:         Thought Content: Thought content normal.         Judgment: Judgment normal.     Counseling / Coordination of Care  Total office time spent today 30 minutes.  Greater than 50% of total time was spent with the patient and / or family counseling and / or " coordination of care.

## 2025-05-07 ENCOUNTER — RESULTS FOLLOW-UP (OUTPATIENT)
Dept: NON INVASIVE DIAGNOSTICS | Facility: HOSPITAL | Age: 65
End: 2025-05-07

## 2025-07-14 ENCOUNTER — APPOINTMENT (OUTPATIENT)
Dept: LAB | Facility: HOSPITAL | Age: 65
End: 2025-07-14
Payer: COMMERCIAL

## 2025-07-14 DIAGNOSIS — I50.20 SYSTOLIC HEART FAILURE, UNSPECIFIED HF CHRONICITY (HCC): ICD-10-CM

## 2025-07-14 DIAGNOSIS — E55.9 AVITAMINOSIS D: ICD-10-CM

## 2025-07-14 DIAGNOSIS — I50.32 HEART FAILURE WITH IMPROVED EJECTION FRACTION (HFIMPEF) (HCC): ICD-10-CM

## 2025-07-14 DIAGNOSIS — N40.1 BENIGN PROSTATIC HYPERPLASIA WITH LOWER URINARY TRACT SYMPTOMS, SYMPTOM DETAILS UNSPECIFIED: ICD-10-CM

## 2025-07-14 DIAGNOSIS — R53.83 OTHER FATIGUE: ICD-10-CM

## 2025-07-14 LAB
25(OH)D3 SERPL-MCNC: 45.1 NG/ML (ref 30–100)
BACTERIA UR QL AUTO: ABNORMAL /HPF
BILIRUB UR QL STRIP: NEGATIVE
BNP SERPL-MCNC: 42 PG/ML (ref 0–100)
CHOLEST SERPL-MCNC: 155 MG/DL (ref ?–200)
CLARITY UR: CLEAR
COLOR UR: ABNORMAL
CREAT UR-MCNC: 87.2 MG/DL
ESTRADIOL SERPL-MCNC: 22.9 PG/ML (ref 0–33.1)
FOLATE SERPL-MCNC: 15.7 NG/ML
GLUCOSE UR STRIP-MCNC: NEGATIVE MG/DL
HDLC SERPL-MCNC: 55 MG/DL
HGB UR QL STRIP.AUTO: NEGATIVE
KETONES UR STRIP-MCNC: NEGATIVE MG/DL
LDLC SERPL CALC-MCNC: 83 MG/DL (ref 0–100)
LEUKOCYTE ESTERASE UR QL STRIP: NEGATIVE
MICROALBUMIN UR-MCNC: 13.2 MG/L
MICROALBUMIN/CREAT 24H UR: 15 MG/G CREATININE (ref 0–30)
NITRITE UR QL STRIP: NEGATIVE
NON-SQ EPI CELLS URNS QL MICRO: ABNORMAL /HPF
NONHDLC SERPL-MCNC: 100 MG/DL
PH UR STRIP.AUTO: 6 [PH]
PROT UR STRIP-MCNC: NEGATIVE MG/DL
PSA SERPL-MCNC: 0.72 NG/ML (ref 0–4)
RBC #/AREA URNS AUTO: ABNORMAL /HPF
SP GR UR STRIP.AUTO: 1.02 (ref 1–1.03)
T4 FREE SERPL-MCNC: 0.84 NG/DL (ref 0.61–1.12)
TRIGL SERPL-MCNC: 85 MG/DL (ref ?–150)
UROBILINOGEN UR STRIP-ACNC: <2 MG/DL
VIT B12 SERPL-MCNC: 362 PG/ML (ref 180–914)
WBC #/AREA URNS AUTO: ABNORMAL /HPF

## 2025-07-14 PROCEDURE — 82043 UR ALBUMIN QUANTITATIVE: CPT

## 2025-07-14 PROCEDURE — G0103 PSA SCREENING: HCPCS

## 2025-07-14 PROCEDURE — 82670 ASSAY OF TOTAL ESTRADIOL: CPT

## 2025-07-14 PROCEDURE — 82306 VITAMIN D 25 HYDROXY: CPT

## 2025-07-14 PROCEDURE — 36415 COLL VENOUS BLD VENIPUNCTURE: CPT

## 2025-07-14 PROCEDURE — 84403 ASSAY OF TOTAL TESTOSTERONE: CPT

## 2025-07-14 PROCEDURE — 82746 ASSAY OF FOLIC ACID SERUM: CPT

## 2025-07-14 PROCEDURE — 82570 ASSAY OF URINE CREATININE: CPT

## 2025-07-14 PROCEDURE — 80061 LIPID PANEL: CPT

## 2025-07-14 PROCEDURE — 84439 ASSAY OF FREE THYROXINE: CPT

## 2025-07-14 PROCEDURE — 82607 VITAMIN B-12: CPT

## 2025-07-14 PROCEDURE — 84402 ASSAY OF FREE TESTOSTERONE: CPT

## 2025-07-14 PROCEDURE — 84425 ASSAY OF VITAMIN B-1: CPT

## 2025-07-14 PROCEDURE — 83880 ASSAY OF NATRIURETIC PEPTIDE: CPT

## 2025-07-14 PROCEDURE — 81001 URINALYSIS AUTO W/SCOPE: CPT

## 2025-07-16 LAB
TESTOST FREE SERPL-MCNC: 6.4 PG/ML (ref 6.6–18.1)
TESTOST SERPL-MCNC: 615 NG/DL (ref 264–916)

## 2025-07-17 LAB — VIT B1 BLD-SCNC: 131.2 NMOL/L (ref 66.5–200)

## 2025-08-21 DIAGNOSIS — I50.9 CHF (CONGESTIVE HEART FAILURE) (HCC): ICD-10-CM

## 2025-08-22 RX ORDER — METOPROLOL SUCCINATE 25 MG/1
25 TABLET, EXTENDED RELEASE ORAL DAILY
Qty: 90 TABLET | Refills: 1 | Status: SHIPPED | OUTPATIENT
Start: 2025-08-22

## 2025-08-22 RX ORDER — LISINOPRIL 10 MG/1
10 TABLET ORAL DAILY
Qty: 90 TABLET | Refills: 1 | Status: SHIPPED | OUTPATIENT
Start: 2025-08-22

## (undated) DEVICE — TREK CORONARY DILATATION CATHETER 3.0 MM X 8 MM / RAPID-EXCHANGE: Brand: TREK

## (undated) DEVICE — Device: Brand: PROWATER

## (undated) DEVICE — Device

## (undated) DEVICE — CATH GUIDE LAUNCHER 6FR EBU 3.5

## (undated) DEVICE — TREK CORONARY DILATATION CATHETER 2.50 MM X 15 MM / OVER-THE-WIRE: Brand: TREK

## (undated) DEVICE — PTCA DILATATION CATHETER: Brand: EMERGE™

## (undated) DEVICE — TREK CORONARY DILATATION CATHETER 3.0 MM X 20 MM / RAPID-EXCHANGE: Brand: TREK

## (undated) DEVICE — GUIDEWIRE WHOLEY HI TORQUE INTERM MOD J .035 145CM

## (undated) DEVICE — DGW .035 FC J3MM 260CM TEF: Brand: EMERALD

## (undated) DEVICE — RADIFOCUS OPTITORQUE ANGIOGRAPHIC CATHETER: Brand: OPTITORQUE

## (undated) DEVICE — GUIDEWIRE BMW HYDROCOAT 190CM

## (undated) DEVICE — CATH DIAG 5FR IMPULSE 110CM PIG

## (undated) DEVICE — TR BAND RADIAL ARTERY COMPRESSION DEVICE: Brand: TR BAND

## (undated) DEVICE — CORONARY IMAGING CATHETER: Brand: OPTICROSS™ 6 HD

## (undated) DEVICE — RUNTHROUGH NS EXTRA FLOPPY PTCA GUIDEWIRE: Brand: RUNTHROUGH

## (undated) DEVICE — GLIDESHEATH BASIC HYDROPHILIC COATED INTRODUCER SHEATH: Brand: GLIDESHEATH